# Patient Record
Sex: MALE | Race: WHITE | NOT HISPANIC OR LATINO | Employment: OTHER | URBAN - METROPOLITAN AREA
[De-identification: names, ages, dates, MRNs, and addresses within clinical notes are randomized per-mention and may not be internally consistent; named-entity substitution may affect disease eponyms.]

---

## 2019-06-10 ENCOUNTER — HOSPITAL ENCOUNTER (EMERGENCY)
Facility: CLINIC | Age: 84
Discharge: HOME | End: 2019-06-11
Attending: EMERGENCY MEDICINE | Admitting: EMERGENCY MEDICINE
Payer: COMMERCIAL

## 2019-06-10 DIAGNOSIS — R11.2 NON-INTRACTABLE VOMITING WITH NAUSEA, UNSPECIFIED VOMITING TYPE: ICD-10-CM

## 2019-06-10 LAB
ALBUMIN SERPL-MCNC: 3.5 G/DL (ref 3.4–5)
ALP SERPL-CCNC: 123 U/L (ref 40–150)
ALT SERPL W P-5'-P-CCNC: 30 U/L (ref 0–70)
ANION GAP SERPL CALCULATED.3IONS-SCNC: 2 MMOL/L (ref 3–14)
AST SERPL W P-5'-P-CCNC: 17 U/L (ref 0–45)
BASOPHILS # BLD AUTO: 0 10E9/L (ref 0–0.2)
BASOPHILS NFR BLD AUTO: 0.5 %
BILIRUB SERPL-MCNC: 0.4 MG/DL (ref 0.2–1.3)
BUN SERPL-MCNC: 17 MG/DL (ref 7–30)
CALCIUM SERPL-MCNC: 8.7 MG/DL (ref 8.5–10.1)
CHLORIDE SERPL-SCNC: 100 MMOL/L (ref 94–109)
CO2 SERPL-SCNC: 32 MMOL/L (ref 20–32)
CREAT SERPL-MCNC: 1.01 MG/DL (ref 0.66–1.25)
DIFFERENTIAL METHOD BLD: ABNORMAL
EOSINOPHIL # BLD AUTO: 0.1 10E9/L (ref 0–0.7)
EOSINOPHIL NFR BLD AUTO: 1.1 %
ERYTHROCYTE [DISTWIDTH] IN BLOOD BY AUTOMATED COUNT: 13.2 % (ref 10–15)
GFR SERPL CREATININE-BSD FRML MDRD: 68 ML/MIN/{1.73_M2}
GLUCOSE SERPL-MCNC: 132 MG/DL (ref 70–99)
HCT VFR BLD AUTO: 38.2 % (ref 40–53)
HGB BLD-MCNC: 12.3 G/DL (ref 13.3–17.7)
IMM GRANULOCYTES # BLD: 0 10E9/L (ref 0–0.4)
IMM GRANULOCYTES NFR BLD: 0.5 %
LYMPHOCYTES # BLD AUTO: 1 10E9/L (ref 0.8–5.3)
LYMPHOCYTES NFR BLD AUTO: 12.3 %
MCH RBC QN AUTO: 29.3 PG (ref 26.5–33)
MCHC RBC AUTO-ENTMCNC: 32.2 G/DL (ref 31.5–36.5)
MCV RBC AUTO: 91 FL (ref 78–100)
MONOCYTES # BLD AUTO: 0.7 10E9/L (ref 0–1.3)
MONOCYTES NFR BLD AUTO: 8.9 %
NEUTROPHILS # BLD AUTO: 6.3 10E9/L (ref 1.6–8.3)
NEUTROPHILS NFR BLD AUTO: 76.7 %
NRBC # BLD AUTO: 0 10*3/UL
NRBC BLD AUTO-RTO: 0 /100
PLATELET # BLD AUTO: 292 10E9/L (ref 150–450)
POTASSIUM SERPL-SCNC: 4.2 MMOL/L (ref 3.4–5.3)
PROT SERPL-MCNC: 7.3 G/DL (ref 6.8–8.8)
RBC # BLD AUTO: 4.2 10E12/L (ref 4.4–5.9)
SODIUM SERPL-SCNC: 134 MMOL/L (ref 133–144)
WBC # BLD AUTO: 8.2 10E9/L (ref 4–11)

## 2019-06-10 PROCEDURE — 80053 COMPREHEN METABOLIC PANEL: CPT | Performed by: EMERGENCY MEDICINE

## 2019-06-10 PROCEDURE — 96361 HYDRATE IV INFUSION ADD-ON: CPT

## 2019-06-10 PROCEDURE — 85025 COMPLETE CBC W/AUTO DIFF WBC: CPT | Performed by: EMERGENCY MEDICINE

## 2019-06-10 PROCEDURE — 96374 THER/PROPH/DIAG INJ IV PUSH: CPT

## 2019-06-10 PROCEDURE — 99285 EMERGENCY DEPT VISIT HI MDM: CPT | Mod: 25

## 2019-06-10 PROCEDURE — 96376 TX/PRO/DX INJ SAME DRUG ADON: CPT

## 2019-06-10 PROCEDURE — 25000128 H RX IP 250 OP 636

## 2019-06-10 PROCEDURE — 25000128 H RX IP 250 OP 636: Performed by: EMERGENCY MEDICINE

## 2019-06-10 RX ORDER — ONDANSETRON 2 MG/ML
INJECTION INTRAMUSCULAR; INTRAVENOUS
Status: COMPLETED
Start: 2019-06-10 | End: 2019-06-10

## 2019-06-10 RX ORDER — ONDANSETRON 2 MG/ML
4 INJECTION INTRAMUSCULAR; INTRAVENOUS ONCE
Status: COMPLETED | OUTPATIENT
Start: 2019-06-10 | End: 2019-06-10

## 2019-06-10 RX ADMIN — ONDANSETRON 4 MG: 2 INJECTION INTRAMUSCULAR; INTRAVENOUS at 22:18

## 2019-06-10 RX ADMIN — SODIUM CHLORIDE 1000 ML: 9 INJECTION, SOLUTION INTRAVENOUS at 22:17

## 2019-06-10 RX ADMIN — ONDANSETRON 4 MG: 2 INJECTION INTRAMUSCULAR; INTRAVENOUS at 23:23

## 2019-06-10 ASSESSMENT — ENCOUNTER SYMPTOMS
ABDOMINAL PAIN: 0
NAUSEA: 1
FEVER: 0
VOMITING: 1

## 2019-06-10 NOTE — ED AVS SNAPSHOT
[facility] Emergency Department  [address]  [facility] [address]  Phone:  [phone]  Fax:  [phone]                                    Edgardo Rice   MRN: [id number]    Department:  [facility] Emergency Department   Date of Visit:  6/10/2019           After Visit Summary Signature Page    I have received my discharge instructions, and my questions have been answered. I have discussed any challenges I see with this plan with the nurse or doctor.    ..........................................................................................................................................  Patient/Patient Representative Signature      ..........................................................................................................................................  Patient Representative Print Name and Relationship to Patient    ..................................................               ................................................  Date                                   Time    ..........................................................................................................................................  Reviewed by Signature/Title    ...................................................              ..............................................  Date                                               Time          22EPIC Rev 08/18

## 2019-06-11 VITALS
WEIGHT: 153 LBS | DIASTOLIC BLOOD PRESSURE: 65 MMHG | RESPIRATION RATE: 16 BRPM | OXYGEN SATURATION: 97 % | TEMPERATURE: 97.3 F | HEART RATE: 51 BPM | SYSTOLIC BLOOD PRESSURE: 169 MMHG

## 2019-06-11 RX ORDER — ONDANSETRON 4 MG/1
4 TABLET, ORALLY DISINTEGRATING ORAL EVERY 6 HOURS PRN
Qty: 15 TABLET | Refills: 0 | Status: SHIPPED | OUTPATIENT
Start: 2019-06-11 | End: 2019-06-11

## 2019-06-11 RX ORDER — ONDANSETRON 4 MG/1
4 TABLET, ORALLY DISINTEGRATING ORAL EVERY 6 HOURS PRN
Qty: 15 TABLET | Refills: 0 | Status: SHIPPED | OUTPATIENT
Start: 2019-06-11 | End: 2020-03-20

## 2019-06-11 NOTE — ED PROVIDER NOTES
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 The patient had a carcinoma removed from cheek around a week ago, he was admitted to the hospital for a day due to post operative nausea. After he returned home, the first few days he felt back to normal. However, the patient reports mouth pain for the last couple or days and emesis starting today which is why he reports today. Here, he reports that he is unable to keep any food or liquids down. Last night the patient took one of his prescribed oxycodone, however it did not help. The patient denies any fevers, abdominal pain, or mouth pain when vomiting. Of note, the patient has experienced post operative nausea, but never for this long after surgery.     Allergies:  Atorvastatin  Cholestyramine  Diphenhydramine  Droperidol  Morphine  Norco  Sulfa drugs  Tramadol  Tylenol with Codeine     Medications:    Lopressor  HCTZ  Norvasc  Lisinopril    Past Medical History:    Retinal emboli  Hyperopia bilateral with astigmatism and presbyopia  Macular degeneration  Hyperlipidemia  Diverticulitis  Anemia  Hypoxia  Bilateral pseudophakia    Past Surgical History:    Knee Arthroscopy  Hemorrhoidectomy  Herniorrhaphy   Vasectomy  Carotid endardectomy  Esophagogastroduodenoscopy    Family History:    Cancer-prostate  Hyperlipidemia  HTN    Social History:  The patient was accompanied to the ED by his daughter.  Smoking Status: former smoker  Smokeless Tobacco: negative  Alcohol Use: positive, 2 cans of beer/ week  Drug Use: negative  Marital Status:   [2]       Review of Systems   Constitutional: Negative for fever.   HENT:        Mouth Pain     Gastrointestinal: Positive for nausea and vomiting. Negative for abdominal pain.   All other systems reviewed and are negative.      Physical Exam     Patient Vitals for the past 24 hrs:    BP Temp Temp src Pulse Resp SpO2 Weight   06/11/19 0025 -- -- -- -- -- 96 % --   06/11/19 0000 181/72 -- -- 50 -- 95 % --   06/10/19 2141 180/75 97.3  F (36.3  C) Temporal 58 16 97 % 69.4 kg (153 lb)       Physical Exam  Nursing note and vitals reviewed.  Constitutional: Cooperative.   HENT:   Mouth/Throat: Moist mucous membranes. Skin graft appears intact without signs of purulence.  Eyes: EOMI, nonicteric sclera  Cardiovascular: Normal rate, regular rhythm, no murmurs, rubs, or gallops  Pulmonary/Chest: Effort normal and breath sounds normal. No respiratory distress. No wheezes. No rales.   Abdominal: Soft. Nontender, nondistended, no guarding or rigidity. BS present.   Musculoskeletal: Normal range of motion.   Neurological: Alert. Moves all extremities spontaneously.   Skin: Skin is warm and dry. No rash noted. Left thigh skin graft site appears to be healing appropriately without signs of infection.   Psychiatric: Normal mood and affect.       Emergency Department Course     Laboratory:  CBC: WBC: 8.2, HGB: 12.3 (L), PLT: 292  CMP: Glucose 132 (H), Anion Gap 2 (L), o/w WNL (Creatinine: 1.01)    Interventions:  2213 NS 1L IV   Zofran 4 mg IV  2320 Zofran 4 mg IV    Emergency Department Course:  Nursing notes and vitals reviewed. (2202) I performed an exam of the patient as documented above.      IV inserted. Medicine administered as documented above. Blood drawn. This was sent to the lab for further testing, results above.    (2220) I consulted with Dr. Magana, ENT, regarding the patient's history and presentation here in the emergency department.     (0040)  I rechecked the patient and discussed the results of his workup thus far and plan for discharge.      Findings and plan explained to the Patient. Patient discharged home with instructions regarding supportive care, medications, and reasons to return. The importance of close follow-up was reviewed. The patient was prescribed Zofran.     I personally reviewed  the laboratory results with the Patient and answered all related questions prior to discharge.    Impression & Plan    Medical Decision Making:  Edgardo Rice is a 84 year old male who presents for evaluation of nausea and vomiting in the context of recent surgery. Exam overall normal. Labs unremarkable. Pt has significant history of frequent nausea/vomiting post-operatively. He even spent the night in the hospital after this last surgery. Unfortunately he wasn't discharged with any anti-emetics and presents here with ongoing symptoms. This is all most likely due to the surgery and post-operative state. Doubt gastroenteritis/food poisoning. Discussed with on call ENT, Dr. Magana, who is in agreement with antiemetics and IVF. He stated he'd be able to see pt in clinic tomorrow and Dr. Ruvalcaba would be able to see on Wednesday. He is feeling improved after zofran and IVF. Tolerated applesauce and fluids here. I believe he's safe/stable for discharge home. All questions answered. He and family are in agreement with plan.       Diagnosis:    ICD-10-CM    1. Non-intractable vomiting with nausea, unspecified vomiting type R11.2        Disposition:  discharged to home    Discharge Medications:     Medication List      Started    ondansetron 4 MG ODT tab  Commonly known as:  ZOFRAN ODT  4 mg, Oral, EVERY 6 HOURS PRN          Scribe Disclosure:  I, Sharona Reardon, am serving as a scribe on 6/10/2019 at 10:16 PM to personally document services performed by Joe Alarcon MD based on my observations and the provider's statements to me.       Sharona Reardon  6/10/2019   [facility] EMERGENCY DEPARTMENT       Joe Alarcon MD  06/13/19 4798

## 2020-01-13 ENCOUNTER — HOSPITAL ENCOUNTER (EMERGENCY)
Facility: CLINIC | Age: 85
Discharge: HOME | End: 2020-01-13
Attending: NURSE PRACTITIONER | Admitting: NURSE PRACTITIONER
Payer: COMMERCIAL

## 2020-01-13 VITALS
DIASTOLIC BLOOD PRESSURE: 89 MMHG | SYSTOLIC BLOOD PRESSURE: 215 MMHG | RESPIRATION RATE: 18 BRPM | HEART RATE: 74 BPM | TEMPERATURE: 99.6 F | OXYGEN SATURATION: 95 %

## 2020-01-13 DIAGNOSIS — Z51.89 VISIT FOR WOUND CHECK: ICD-10-CM

## 2020-01-13 PROCEDURE — 99282 EMERGENCY DEPT VISIT SF MDM: CPT

## 2020-01-13 ASSESSMENT — ENCOUNTER SYMPTOMS
COLOR CHANGE: 0
WOUND: 1
WEAKNESS: 0
NUMBNESS: 0
FEVER: 0

## 2020-01-13 NOTE — ED AVS SNAPSHOT
[facility] Emergency Department  [address]  [facility] [address]  Phone:  [phone]  Fax:  [phone]                                    Edgardo Rice   MRN: [id number]    Department:  [facility] Emergency Department   Date of Visit:  1/13/2020           After Visit Summary Signature Page    I have received my discharge instructions, and my questions have been answered. I have discussed any challenges I see with this plan with the nurse or doctor.    ..........................................................................................................................................  Patient/Patient Representative Signature      ..........................................................................................................................................  Patient Representative Print Name and Relationship to Patient    ..................................................               ................................................  Date                                   Time    ..........................................................................................................................................  Reviewed by Signature/Title    ...................................................              ..............................................  Date                                               Time          22EPIC Rev 08/18

## 2020-01-14 NOTE — ED PROVIDER NOTES
History     Chief Complaint:  Wound Check       The history is provided by the patient.      Edgardo Rice is a 85 year old male who presents with an open wound to the dorsal aspect of his left hand. The patient states that he was scratched by his cat a couple of weeks ago and this had scabbed over, and today he put his hand into his pocket and the scab was pulled off. The patient states that the wound has been persistently bleeding since then. To note, the patient is currently on aspirin.     Allergies:  Atorvastatin  Cholestyramine  Diphenhydramine  Droperidol  Morphine  Norco [Hydrocodone-Acetaminophen]  Sulfa Drugs  Tramadol  Tylenol With Codeine [Acetaminophen-Codeine]     Medications:    Zofran  Aspirin    Past Medical History:    The patient denies any significant past medical history.    Past Surgical History:    The patient does not have any pertinent past surgical history.    Family History:    No past pertinent family history.    Social History:  The patient presents to the ED alone.  PCP: Bibiana Umanzor     Review of Systems   Constitutional: Negative for fever.   Skin: Positive for wound (Open wound to left hand). Negative for color change.   Neurological: Negative for weakness and numbness.   All other systems reviewed and are negative.      Physical Exam     Patient Vitals for the past 24 hrs:   BP Temp Temp src Pulse Resp SpO2   01/13/20 1957 (!) 215/89 99.6  F (37.6  C) Temporal 74 18 95 %       Physical Exam  General: Alert, Mild discomfort, well kept   HENT:  Normal voice, No lymphadenopathy  Eyes:  The pupils are equal, round, and reactive to light, Conjunctiva normal, No scleral icterus   Neck:  Normal range of motion  CV:  Normal Pulses, Normal cap refill  Resp:  Non-labored, No cough  MS:  Small wound to dorsal aspect of eft hand, No indication for Flexor or extensor tendon injury, Normal ROM if all digits  Skin:  Approximately 1 cm x 1 cm coronary type radiation versus skin tear  "dorsum of left hand.  No proximal streaking or erythema.  Mild venous oozing.  Nontender.  Neuro:  Speech is normal and fluent, Normal sensation  Psych:  Awake. Alert.  Normal affect.  Appropriate interactions. Good eye contact    Emergency Department Course     Emergency Department Course:  Past medical records, nursing notes, and vitals reviewed.    2007 I performed an exam of the patient as documented above.     Surgifoam placed on the patient's wound.     2040 I rechecked the patient.    Findings and plan explained to the patient. Patient discharged home with instructions regarding supportive care, medications, and reasons to return. The importance of close follow-up was reviewed.    I personally answered all related questions prior to discharge.     Impression & Plan     Medical Decision Making:  Edgardo Rice is a 85 year old male who presents today for evaluation of difficulty controlling bleeding from a hand wound.  He reports that his cat scratched him a couple of weeks ago and has had a scab on that hand until today when he reached in his pocket and the scab was torn off.  Since that time he has had difficulty controlling the bleeding therefore presented for evaluation.  His examination is consistent with small skin tear.  This is nonsuturable.  A Surgifoam dressing is placed.  He is advised on appropriate wound care.  There is no indication of infection at this time.  No indication for antibiotics.  Of note he did have a elevated blood pressure today which he states happens \"all of the time at the doctor's office.\"  He does regularly check his blood pressure at home and states it is in the normal range in the 110s-120 systolically.  He has no signs of hypertensive crisis or emergency.  He is advised on appropriate care of the wound.  He appears to be safe and appropriate for outpatient management follow-up and is discharged home.    Diagnosis:    ICD-10-CM    1. Visit for wound check Z51.89  "       Disposition:  Discharged to home.    Scribe Disclosure:  I, Gelacio Mckay, am serving as a scribe at 8:05 PM on 1/13/2020 to document services personally performed by Eric Torres APRN based on my observations and the provider's statements to me.      Eric Torres APRN CNP  01/13/20 2100

## 2020-01-14 NOTE — ED TRIAGE NOTES
Patient had a scab that fell off the top of his hand when he put his hand in pants pocket and now cut will not stop bleeding. Bleeding controlled. Last tetanus 2016

## 2020-03-20 ENCOUNTER — APPOINTMENT (OUTPATIENT)
Dept: CARDIOLOGY | Facility: CLINIC | Age: 85
End: 2020-03-20
Attending: EMERGENCY MEDICINE
Payer: COMMERCIAL

## 2020-03-20 ENCOUNTER — APPOINTMENT (OUTPATIENT)
Dept: MRI IMAGING | Facility: CLINIC | Age: 85
End: 2020-03-20
Attending: EMERGENCY MEDICINE
Payer: COMMERCIAL

## 2020-03-20 ENCOUNTER — HOSPITAL ENCOUNTER (EMERGENCY)
Facility: CLINIC | Age: 85
Discharge: HOME | End: 2020-03-20
Attending: EMERGENCY MEDICINE | Admitting: EMERGENCY MEDICINE
Payer: COMMERCIAL

## 2020-03-20 VITALS
OXYGEN SATURATION: 94 % | WEIGHT: 161.6 LBS | TEMPERATURE: 97.7 F | DIASTOLIC BLOOD PRESSURE: 76 MMHG | HEART RATE: 54 BPM | RESPIRATION RATE: 16 BRPM | SYSTOLIC BLOOD PRESSURE: 168 MMHG

## 2020-03-20 DIAGNOSIS — I63.9 CEREBROVASCULAR ACCIDENT (CVA), UNSPECIFIED MECHANISM (H): ICD-10-CM

## 2020-03-20 LAB
ANION GAP SERPL CALCULATED.3IONS-SCNC: 3 MMOL/L (ref 3–14)
BASOPHILS # BLD AUTO: 0.1 10E9/L (ref 0–0.2)
BASOPHILS NFR BLD AUTO: 1 %
BUN SERPL-MCNC: 17 MG/DL (ref 7–30)
CALCIUM SERPL-MCNC: 8.6 MG/DL (ref 8.5–10.1)
CHLORIDE SERPL-SCNC: 107 MMOL/L (ref 94–109)
CHOLEST SERPL-MCNC: 221 MG/DL
CO2 SERPL-SCNC: 30 MMOL/L (ref 20–32)
CREAT SERPL-MCNC: 0.86 MG/DL (ref 0.66–1.25)
DIFFERENTIAL METHOD BLD: NORMAL
EOSINOPHIL # BLD AUTO: 0.1 10E9/L (ref 0–0.7)
EOSINOPHIL NFR BLD AUTO: 2 %
ERYTHROCYTE [DISTWIDTH] IN BLOOD BY AUTOMATED COUNT: 12.3 % (ref 10–15)
GFR SERPL CREATININE-BSD FRML MDRD: 80 ML/MIN/{1.73_M2}
GLUCOSE BLDC GLUCOMTR-MCNC: 117 MG/DL (ref 70–99)
GLUCOSE SERPL-MCNC: 113 MG/DL (ref 70–99)
HBA1C MFR BLD: 5.6 % (ref 0–5.6)
HCT VFR BLD AUTO: 45.7 % (ref 40–53)
HDLC SERPL-MCNC: 43 MG/DL
HGB BLD-MCNC: 14.5 G/DL (ref 13.3–17.7)
IMM GRANULOCYTES # BLD: 0 10E9/L (ref 0–0.4)
IMM GRANULOCYTES NFR BLD: 0.3 %
INTERPRETATION ECG - MUSE: NORMAL
LDLC SERPL CALC-MCNC: ABNORMAL MG/DL
LYMPHOCYTES # BLD AUTO: 1 10E9/L (ref 0.8–5.3)
LYMPHOCYTES NFR BLD AUTO: 13.9 %
MCH RBC QN AUTO: 30.7 PG (ref 26.5–33)
MCHC RBC AUTO-ENTMCNC: 31.7 G/DL (ref 31.5–36.5)
MCV RBC AUTO: 97 FL (ref 78–100)
MONOCYTES # BLD AUTO: 0.5 10E9/L (ref 0–1.3)
MONOCYTES NFR BLD AUTO: 7.6 %
NEUTROPHILS # BLD AUTO: 5.4 10E9/L (ref 1.6–8.3)
NEUTROPHILS NFR BLD AUTO: 75.2 %
NONHDLC SERPL-MCNC: 178 MG/DL
NRBC # BLD AUTO: 0 10*3/UL
NRBC BLD AUTO-RTO: 0 /100
PLATELET # BLD AUTO: 241 10E9/L (ref 150–450)
POTASSIUM SERPL-SCNC: 4.1 MMOL/L (ref 3.4–5.3)
RBC # BLD AUTO: 4.73 10E12/L (ref 4.4–5.9)
SODIUM SERPL-SCNC: 140 MMOL/L (ref 133–144)
TRIGL SERPL-MCNC: 416 MG/DL
TROPONIN I SERPL-MCNC: <0.015 UG/L (ref 0–0.04)
TSH SERPL DL<=0.005 MIU/L-ACNC: 1.27 MU/L (ref 0.4–4)
WBC # BLD AUTO: 7.1 10E9/L (ref 4–11)

## 2020-03-20 PROCEDURE — 84443 ASSAY THYROID STIM HORMONE: CPT | Performed by: EMERGENCY MEDICINE

## 2020-03-20 PROCEDURE — 80061 LIPID PANEL: CPT | Performed by: PHYSICIAN ASSISTANT

## 2020-03-20 PROCEDURE — A9585 GADOBUTROL INJECTION: HCPCS | Performed by: EMERGENCY MEDICINE

## 2020-03-20 PROCEDURE — 93306 TTE W/DOPPLER COMPLETE: CPT

## 2020-03-20 PROCEDURE — 99285 EMERGENCY DEPT VISIT HI MDM: CPT | Mod: 25

## 2020-03-20 PROCEDURE — 93270 REMOTE 30 DAY ECG REV/REPORT: CPT

## 2020-03-20 PROCEDURE — 00000146 ZZHCL STATISTIC GLUCOSE BY METER IP

## 2020-03-20 PROCEDURE — 80061 LIPID PANEL: CPT | Performed by: EMERGENCY MEDICINE

## 2020-03-20 PROCEDURE — 93306 TTE W/DOPPLER COMPLETE: CPT | Mod: 26 | Performed by: INTERNAL MEDICINE

## 2020-03-20 PROCEDURE — 93005 ELECTROCARDIOGRAM TRACING: CPT | Mod: 59

## 2020-03-20 PROCEDURE — 93272 ECG/REVIEW INTERPRET ONLY: CPT | Performed by: INTERNAL MEDICINE

## 2020-03-20 PROCEDURE — 70544 MR ANGIOGRAPHY HEAD W/O DYE: CPT | Mod: XS

## 2020-03-20 PROCEDURE — 70553 MRI BRAIN STEM W/O & W/DYE: CPT

## 2020-03-20 PROCEDURE — 25000132 ZZH RX MED GY IP 250 OP 250 PS 637: Performed by: EMERGENCY MEDICINE

## 2020-03-20 PROCEDURE — 84484 ASSAY OF TROPONIN QUANT: CPT | Performed by: EMERGENCY MEDICINE

## 2020-03-20 PROCEDURE — 83036 HEMOGLOBIN GLYCOSYLATED A1C: CPT | Performed by: EMERGENCY MEDICINE

## 2020-03-20 PROCEDURE — 25500064 ZZH RX 255 OP 636: Performed by: EMERGENCY MEDICINE

## 2020-03-20 PROCEDURE — 70549 MR ANGIOGRAPH NECK W/O&W/DYE: CPT

## 2020-03-20 PROCEDURE — 25800025 ZZH RX 258: Performed by: EMERGENCY MEDICINE

## 2020-03-20 PROCEDURE — 85025 COMPLETE CBC W/AUTO DIFF WBC: CPT | Performed by: EMERGENCY MEDICINE

## 2020-03-20 PROCEDURE — 80048 BASIC METABOLIC PNL TOTAL CA: CPT | Performed by: EMERGENCY MEDICINE

## 2020-03-20 RX ORDER — ACETAMINOPHEN 500 MG
1000 TABLET ORAL 3 TIMES DAILY PRN
COMMUNITY

## 2020-03-20 RX ORDER — CLOPIDOGREL BISULFATE 75 MG/1
300 TABLET ORAL ONCE
Status: COMPLETED | OUTPATIENT
Start: 2020-03-20 | End: 2020-03-20

## 2020-03-20 RX ORDER — ACYCLOVIR 200 MG/1
30 CAPSULE ORAL ONCE
Status: COMPLETED | OUTPATIENT
Start: 2020-03-20 | End: 2020-03-20

## 2020-03-20 RX ORDER — ASPIRIN 325 MG
325 TABLET ORAL DAILY
Qty: 30 TABLET | Refills: 3 | Status: ON HOLD | OUTPATIENT
Start: 2020-03-20 | End: 2020-06-10

## 2020-03-20 RX ORDER — ASPIRIN 81 MG/1
81 TABLET, CHEWABLE ORAL ONCE
Status: COMPLETED | OUTPATIENT
Start: 2020-03-20 | End: 2020-03-20

## 2020-03-20 RX ORDER — LISINOPRIL 20 MG/1
40 TABLET ORAL DAILY
Status: ON HOLD | COMMUNITY
End: 2021-01-27

## 2020-03-20 RX ORDER — GADOBUTROL 604.72 MG/ML
10 INJECTION INTRAVENOUS ONCE
Status: COMPLETED | OUTPATIENT
Start: 2020-03-20 | End: 2020-03-20

## 2020-03-20 RX ORDER — METOPROLOL TARTRATE 25 MG/1
25 TABLET, FILM COATED ORAL 2 TIMES DAILY
Status: ON HOLD | COMMUNITY
End: 2020-06-10

## 2020-03-20 RX ORDER — AMLODIPINE BESYLATE 10 MG/1
10 TABLET ORAL AT BEDTIME
COMMUNITY

## 2020-03-20 RX ORDER — GABAPENTIN 300 MG/1
900 CAPSULE ORAL AT BEDTIME
COMMUNITY

## 2020-03-20 RX ORDER — MULTIPLE VITAMINS W/ MINERALS TAB 9MG-400MCG
1 TAB ORAL DAILY
COMMUNITY

## 2020-03-20 RX ORDER — CLOPIDOGREL BISULFATE 75 MG/1
75 TABLET ORAL DAILY
Qty: 30 TABLET | Refills: 3 | Status: ON HOLD | OUTPATIENT
Start: 2020-03-20 | End: 2020-06-10

## 2020-03-20 RX ADMIN — GADOBUTROL 10 ML: 604.72 INJECTION INTRAVENOUS at 12:23

## 2020-03-20 RX ADMIN — CLOPIDOGREL BISULFATE 300 MG: 75 TABLET ORAL at 13:53

## 2020-03-20 RX ADMIN — SODIUM CHLORIDE 30 ML: 9 INJECTION, SOLUTION INTRAMUSCULAR; INTRAVENOUS; SUBCUTANEOUS at 14:45

## 2020-03-20 RX ADMIN — ASPIRIN 81 MG 81 MG: 81 TABLET ORAL at 13:53

## 2020-03-20 ASSESSMENT — ENCOUNTER SYMPTOMS
HEADACHES: 0
LIGHT-HEADEDNESS: 1

## 2020-03-20 NOTE — ED PROVIDER NOTES
History     Chief Complaint:  Visual disturbance      The history is provided by the patient.      Edgardo Rice is a 85 year old male with a history of hypertension controlled with Lisinopril, CAD and carotid endarterectomy and who presents with blurred vision. According to the patient, starting yesterday his vision seemed to be blurrier than normal. He explains he sometimes sees black lines when looking at a blank surface, and while he is watching TV, he has diplopia. He says these abnormalities are the same in both eyes. He reports no changes in these symptoms today, but notes some lightheadedness that is new today. He denies headache. Of note, the patient states he recently had imaging of his brain and things came back looking normal.      Allergies:  Atorvastatin  Cholestyramine  Diphenhydramine  Droperidol  Morphine  Norco   Sulfa Drugs  Tramadol  Tylenol With Codeine      Medications:    Aspirin 81 mg   Lopressor   Norvasc   Prilosec  Lisinopril   Gabapentin     Past Medical History:    Pneumonia    Tongue mass  CAD  Esophageal reflux  Diverticulitis   Hypercholesterolemia   Hypertension  Macular degeneration   Squamous cell carcinoma  Bilateral pseudophakia  Sepsis  Spinal stenosis  Skin melanoma  Hiatus hernia  Osteoarthritis  Peptic ulcer    Past Surgical History:    Hernia repair x3  EGD  Cholecystectomy   Carotid endarterectomy  Cataracts removal x2  Hemorrhoidectomy  Knee arthroscopy   Vasectomy  Mohs head and neck     Family History:    Prostate cancer  Heart disease  Hyperlipidemia  Hypertension  Melanoma    Social History:  Smoking status: former smoker  Alcohol use: yes  PCP: [facility]  Presents to the ED alone  Marital Status:   [2]     Review of Systems   Eyes: Positive for visual disturbance (blurred and doubel vision).   Neurological: Positive for light-headedness. Negative for headaches.   All other systems reviewed and are negative.    Physical Exam     Patient Vitals  for the past 24 hrs:   BP Temp Temp src Pulse Heart Rate Resp SpO2 Weight   03/20/20 1653 -- -- -- -- -- 16 -- --   03/20/20 1630 (!) 168/76 -- -- 54 -- -- 94 % --   03/20/20 1615 (!) 162/63 -- -- 56 -- -- 93 % --   03/20/20 1515 (!) 197/85 -- -- 58 -- -- 98 % --   03/20/20 1445 (!) 180/80 -- -- 57 -- -- 98 % --   03/20/20 1430 (!) 168/76 -- -- 53 -- -- 95 % --   03/20/20 1330 (!) 183/76 -- -- 53 -- -- 97 % --   03/20/20 1215 (!) 172/71 -- -- 51 -- -- 96 % --   03/20/20 1200 (!) 167/63 -- -- 57 -- -- 97 % --   03/20/20 1130 (!) 174/62 -- -- 53 -- -- 96 % --   03/20/20 1115 (!) 204/83 -- -- 63 -- -- 96 % --   03/20/20 1105 (!) 190/89 97.7  F (36.5  C) Oral 63 63 16 97 % 73.3 kg (161 lb 9.6 oz)         Physical Exam   Constitutional: Vital signs reviewed as above.   HENT:               Head: No external signs of trauma noted.              Eyes: Pupils are equal, round, and reactive to light. No papilledema noted on limited ED exam.  Cardiovascular: Normal rate, regular rhythm, normal heart sounds and intact distal pulses.    Pulmonary/Chest: Effort normal and breath sounds normal. No respiratory distress. No wheezes noted.   Gastrointestinal: Soft. There is no tenderness. There is no rebound.   Musculoskeletal:              No deformities appreciated              No edema noted  Neurological:               Patient is alert and oriented to person, place, and time.               Speech is fluent, cognition is normal.              CN 2-12 intact (PERRL, EOMI, symmetric smile, equal eye squeeze and forehead raise, normal and equal sensation to bilateral forehead/cheek/chin, equal hearing to finger rub, midline tongue protrusion with nl side-to-side movement, normal shoulder shrug).               RUE strength 5/5: , finger abd, wrist flex/ext, elbow flex/ext.               LUE strength 5/5: , finger abd, wrist flex/ext, elbow flex/ext.               RLE strength 5/5: ankle flex/ext, knee flex/ext, hip flex.                LLE strength 5/5: ankle flex/ext, knee flex/ext, hip flex.               Sensation equal in all 4 extremities.               No arm drift.                Cerebellar: Normal rapid alternating movements                           ( finger-nose-finger, rapid pronation/supination, hand rolling)                          Normal heel-to-shin  Skin: Skin is warm and dry.   Psychiatric: The patient appears calm    Emergency Department Course     ECG:  NSR  Nonspecific ST abnormality  Abnormal EKG  Rate: 61. MT: 134. QRS: 88. QTc: 398.  P-R-T Axes:   19   30   2  No change noted from 8/11/2009  Interpreted by me at 1145 on 3/20/2020    Imaging:  Radiology findings were communicated with the patient who voiced understanding of the findings.    MR Brain w/o & w/ contrast:  1.  Small, 4 mm area of restricted diffusion in the cortex of the   right parietal lobe. Suspect small recent infarct.     2. There is diffuse parenchymal volume loss.  White matter changes are   present in the cerebral hemispheres that are consistent with small   vessel ischemic disease in this age patient, as per radiology.      MR Head w/o contrast Angiogram:  Areas of mild stenosis in the middle cerebral artery   distributions and right posterior cerebral. This is compatible with   atherosclerotic disease, as per radiology.      MR Neck w/o & w/ contrast Angiogram:  1. No stenosis is seen at the right carotid bifurcation.   2. Left carotid endarterectomy. No stenosis identified at the left   carotid bifurcation.   3. Vertebral arteries are unremarkable, as per radiology.      Echocardiogram Complete with Bubble study:  1. Normal biventricular size and function. Left ventricular ejection fraction   of 55-60%. No segmental wall motion abnormalities noted.   2. There is moderate left atrial enlargement.   3. There is mild-moderate (1-2+) mitral regurgitation.   4. Normal pulmonary artery pressure.   No prior study for comparison.   Technically adequate  study, as per Dr. Irwin.     Cardiac Event Monitor Adult Pediatric:  Pending    Laboratory:  Laboratory findings were communicated with the patient who voiced understanding of the findings.    CBC: WBC: 7., HGB: 14.5, PLT: 241    BMP: Glucose 113 (H) o/w WNL (Creatinine 0.86)    1103 Glucose by meter: 117 (H)    1112 Troponin: <0.015        Lipid profile: Cholesterol 22 (H), Triglycerides 416 (H), Non HDL Cholesterol 178 (H)    Hemoglobin A1c: 5.6    TSH with free T4 reflex: 1.27    Procedures:  None     Interventions:  1223 Gadavist 10 mL IV  1353 Plavix 00 mg PO  1353 Aspirin 81 mg PO  1445 Sodium Chloride Bacteriostatic 30 mL IV    Emergency Department Course:  Past medical records, nursing notes, and vitals reviewed.    1103 I performed an exam of the patient as documented above.     1105 Blood sugar 117.     EKG obtained in the ED, see results above.      IV was inserted and blood was drawn for laboratory testing, results above.    The patient was sent for MRIs of the head, brain and neck while in the emergency department, results above.     1132 I consulted with Dr. Vickers, Stroke Neurology, regarding the patient's history and presentation here in the emergency department.      1337 D/W Dr. Vickers. Discussed MRI result. Patient reportedly took full dose aspirin this AM. Order Plavix 300 mg load. Admit. Recommend 325 mg daily ASA with daily 75    1340 Patient rechecked and updated.      1346 Patient rechecked and updated.      1401 I consulted with Myesha Campos PA-C.     1539 I consulted with Dr. Vickers regarding the patient's history and presentation here in the emergency department.  If we can get both the echo and the event monitor placed, the patient can be discharged.  Given his extensive atherosclerotic disease the recommendation would be for 75 mg of Plavix and 325 mg of aspirin daily for 3 months and after that time reevaluation of anticoagulation status is reasonable.    1619: Rechecked and  updated.    I discussed the treatment plan with the patient. They expressed understanding of this plan and consented to discharge. They will be discharged home with instructions for care and follow up. In addition, the patient will return to the emergency department if their symptoms persist, worsen, if new symptoms arise or if there is any concern.  All questions were answered. The patient was prescribed Plavix and Aspirin.     I personally reviewed the laboratory and imaging results with the Patient and answered all related questions prior to discharge.     Impression & Plan     CMS Diagnoses: The patient has stroke symptoms:           ED Stroke specific documentation           NIHSS PDF          Protocol PDF     Patient last known well time: 3/19/2020  ED Provider first to bedside at: 1103  MRI Results received at: 1328    tPA:   Not given due to minor / isolated / quickly resolving stroke symptoms.    If treating with tPA: Ensure SBP<185 and DBP<105 prior to treatment with IV tPA.  Administering IV tPA after treatment with IV labetalol, hydralazine, or nicardipine is reasonable once BP control is established.    Endovascular Retrieval:  Not initiated due to absence of proximal vessel occlusion    National Institutes of Health Stroke Scale (Baseline)  Time Performed: 1103      Score    Level of consciousness: (0)   Alert, keenly responsive    LOC questions: (0)   Answers both questions correctly    LOC commands: (0)   Performs both tasks correctly    Best gaze: (0)   Normal    Visual: (0)   No visual loss    Facial palsy: (0)   Normal symmetrical movements    Motor arm (left): (0)   No drift    Motor arm (right): (0)   No drift    Motor leg (left): (0)   No drift    Motor leg (right): (0)   No drift    Limb ataxia: (0)   Absent    Sensory: (0)   Normal- no sensory loss    Best language: (0)   Normal- no aphasia    Dysarthria: (0)   Normal    Extinction and inattention: (0)   No abnormality        Total Score:  0         Stroke Mimics were considered (including migraine headache, seizure disorder, hypoglycemia (or hyperglycemia), head or spinal trauma, CNS infection, Toxin ingestion and shock state (e.g. sepsis) .    Medical Decision Making:  Edgardo Rice is a 85 year old male who presents to the emergency department today with visual changes.  Please see the HPI and exam for specifics.  The patient's neurological exam appeared normal in the ED except for a small amount of blurry vision when testing visual fields on the left eye.  There did not seem to be any obvious visual field cut today.  The patient was actually found to have had a recent stroke.  Generally, the patient does not appear to have any acute symptoms.  After discussion with neurology and the hospitalist team, and given the fact that we were able to perform an echocardiogram and attached an event monitor, we believe the patient can be discharged to follow with neurology in the outpatient setting.  Because of his extensive atherosclerotic disease he will be discharged with 3 months worth of daily Plavix and aspirin.  He should follow-up as noted and return to the ED with any worsening or concerning symptoms.  Anticipatory guidance given prior to discharge.    Discharge Diagnosis:    ICD-10-CM    1. Cerebrovascular accident (CVA), unspecified mechanism (H)  I63.9 Lipid Profile     Lipid Profile     Hemoglobin A1c     Hemoglobin A1c     TSH with free T4 reflex     TSH with free T4 reflex     CANCELED: TSH with free T4 reflex     CANCELED: Hemoglobin A1c     CANCELED: Lipid Profile       Disposition:  Discharged to home.      Discharge Medications:   New Prescriptions    ASPIRIN (ASA) 325 MG TABLET    Take 1 tablet (325 mg) by mouth daily    CLOPIDOGREL (PLAVIX) 75 MG TABLET    Take 1 tablet (75 mg) by mouth daily     Scribe Disclosure:  Funmilayo SUMMERS, am serving as a scribe at 11:11 AM on 3/20/2020 to document services personally performed by Leonela  Jose Roberto Suresh DO based on my observations and the provider's statements to me.   3/20/2020   [facility] EMERGENCY DEPARTMENT       Gipson, Jose Roberto Suresh DO  03/20/20 1363

## 2020-03-20 NOTE — PHARMACY-ADMISSION MEDICATION HISTORY
Admission medication history interview status for this patient is complete. See [facility] admission navigator for allergy information, prior to admission medications and immunization status.     Medication history interview source(s):Patient  Medication history resources (including written lists, pill bottles, clinic record): Isabella and Surescripts via [facility]  Primary pharmacy: [facility] on [address] ([place])    Changes made to PTA medication list:  Added: all (, MVI w/minerals, acetaminophen, metoprolol tartrate, amlodipine, omeprazole, lisinopril, gabapentin)  Deleted: ondansetron  Changed: NA    Actions taken by pharmacist (provider contacted, etc):None     Additional medication history information:None    Medication reconciliation/reorder completed by provider prior to medication history?  N   (Y/N)     For patients on insulin therapy: NA  (Y/N)    Prior to Admission medications    Medication Sig Last Dose Taking? Auth Provider   acetaminophen (TYLENOL) 500 MG tablet Take 1,000 mg by mouth At Bedtime 3/19/2020 at hs Yes Unknown, Entered By History   amLODIPine (NORVASC) 10 MG tablet Take 10 mg by mouth At Bedtime 3/19/2020 at hs Yes Unknown, Entered By History   aspirin (ASA) 325 MG EC tablet Take 325 mg by mouth daily 3/20/2020 at am Yes Unknown, Entered By History   gabapentin (NEURONTIN) 300 MG capsule Take 600 mg by mouth At Bedtime 3/19/2020 at hs Yes Unknown, Entered By History   lisinopril (ZESTRIL) 20 MG tablet Take 40 mg by mouth daily 3/20/2020 at am Yes Unknown, Entered By History   metoprolol tartrate (LOPRESSOR) 25 MG tablet Take 25 mg by mouth 2 times daily 3/20/2020 at am Yes Unknown, Entered By History   multivitamin w/minerals (THERA-VIT-M) tablet Take 1 tablet by mouth daily 3/20/2020 at am Yes Unknown, Entered By History   omeprazole (PRILOSEC) 20 MG DR capsule Take 20 mg by mouth daily 3/20/2020 at am Yes Unknown, Entered By History

## 2020-03-20 NOTE — ED TRIAGE NOTES
Pt with blurred vision out of both eyes since yesterday. Denies difficulty walking or weakness. ABC's intact, alert and oriented X3.

## 2020-03-20 NOTE — ED NOTES
[facility]  ED Nurse Handoff Report    Edgardo Rice is a 85 year old male   ED Chief complaint: No chief complaint on file.  . ED Diagnosis:   Final diagnoses:   Cerebrovascular accident (CVA), unspecified mechanism (H)     Allergies:   Allergies   Allergen Reactions     Atorvastatin      Cholestyramine      Diphenhydramine      Droperidol      Morphine      Norco [Hydrocodone-Acetaminophen]      Sulfa Drugs      Tramadol      Tylenol With Codeine [Acetaminophen-Codeine]        Code Status: Full Code  Activity level - Baseline/Home:  Independent. Activity Level - Current:   Stand by Assist. Lift room needed: No. Bariatric: No   Needed: No   Isolation: No. Infection: Not Applicable.     Vital Signs:   Vitals:    03/20/20 1130 03/20/20 1200 03/20/20 1215 03/20/20 1330   BP: (!) 174/62 (!) 167/63 (!) 172/71 (!) 183/76   Pulse: 53 57 51 53   Resp:       Temp:       TempSrc:       SpO2: 96% 97% 96% 97%   Weight:           Cardiac Rhythm:  ,      Pain level: 0-10 Pain Scale: 0  Patient confused: No. Patient Falls Risk: Yes.   Elimination Status: Has not needed to void while in ED.   Patient Report - Initial Complaint: blurred vision. Focused Assessment: pt c/o blurred vision since last night, denies weakness.   Tests Performed: MRI, labs. Abnormal Results: small area of infarct.   Treatments provided: Plavix, ASA.  Family Comments: N/A  OBS brochure/video discussed/provided to patient:  N/A  ED Medications:   Medications   sodium chloride (PF) 0.9% PF flush 60 mL (100 mLs Intravenous Given 3/20/20 1223)   gadobutrol (GADAVIST) injection 10 mL (10 mLs Intravenous Given 3/20/20 1223)   clopidogrel (PLAVIX) tablet 300 mg (300 mg Oral Given 3/20/20 1353)   aspirin (ASA) chewable tablet 81 mg (81 mg Oral Given 3/20/20 1353)     Drips infusing:  No  For the majority of the shift, the patient's behavior Green. Interventions performed were N/A.    Sepsis treatment initiated: No       ED Nurse Name/Phone  Number: Julianna Mims RN,   1:56 PM

## 2020-03-20 NOTE — DISCHARGE INSTRUCTIONS
Diagnosis: Stroke  What do you do next: Please follow-up with the neurology contact number I have listed.  You should begin taking Plavix as well as 325 mg of aspirin daily.  This will be for the next 3 months and then these medications can be reevaluated.  When do you return: If you have any sense of dizziness, focal weakness, or any other symptoms that concern you please return to the ED for reevaluation.

## 2020-03-20 NOTE — ED AVS SNAPSHOT
[facility] Emergency Department  [address]  [facility] [address]  Phone:  [phone]  Fax:  [phone]                                    Edgardo Rice   MRN: [id number]    Department:  [facility] Emergency Department   Date of Visit:  3/20/2020           After Visit Summary Signature Page    I have received my discharge instructions, and my questions have been answered. I have discussed any challenges I see with this plan with the nurse or doctor.    ..........................................................................................................................................  Patient/Patient Representative Signature      ..........................................................................................................................................  Patient Representative Print Name and Relationship to Patient    ..................................................               ................................................  Date                                   Time    ..........................................................................................................................................  Reviewed by Signature/Title    ...................................................              ..............................................  Date                                               Time          22EPIC Rev 08/18

## 2020-05-14 NOTE — ED NOTES
Called patient and reviewed Holter results. Encouraged further PCP follow up.     Jose Roberto Gipson,   05/14/20 [id number]

## 2020-06-08 ENCOUNTER — APPOINTMENT (OUTPATIENT)
Dept: MRI IMAGING | Facility: CLINIC | Age: 85
End: 2020-06-08
Attending: PHYSICIAN ASSISTANT
Payer: COMMERCIAL

## 2020-06-08 ENCOUNTER — APPOINTMENT (OUTPATIENT)
Dept: CT IMAGING | Facility: CLINIC | Age: 85
End: 2020-06-08
Attending: PHYSICIAN ASSISTANT
Payer: COMMERCIAL

## 2020-06-08 ENCOUNTER — HOSPITAL ENCOUNTER (OUTPATIENT)
Facility: CLINIC | Age: 85
Setting detail: OBSERVATION
Discharge: HOME | End: 2020-06-10
Attending: PHYSICIAN ASSISTANT | Admitting: INTERNAL MEDICINE
Payer: COMMERCIAL

## 2020-06-08 ENCOUNTER — APPOINTMENT (OUTPATIENT)
Dept: GENERAL RADIOLOGY | Facility: CLINIC | Age: 85
End: 2020-06-08
Attending: PHYSICIAN ASSISTANT
Payer: COMMERCIAL

## 2020-06-08 DIAGNOSIS — I63.89 CEREBROVASCULAR ACCIDENT (CVA) DUE TO OTHER MECHANISM (H): Primary | ICD-10-CM

## 2020-06-08 DIAGNOSIS — I63.9 CEREBROVASCULAR ACCIDENT (H): ICD-10-CM

## 2020-06-08 DIAGNOSIS — R42 DIZZINESS: ICD-10-CM

## 2020-06-08 DIAGNOSIS — R11.2 NAUSEA AND VOMITING: ICD-10-CM

## 2020-06-08 LAB
ALBUMIN SERPL-MCNC: 3.7 G/DL (ref 3.4–5)
ALP SERPL-CCNC: 78 U/L (ref 40–150)
ALT SERPL W P-5'-P-CCNC: 23 U/L (ref 0–70)
ANION GAP SERPL CALCULATED.3IONS-SCNC: 3 MMOL/L (ref 3–14)
AST SERPL W P-5'-P-CCNC: 21 U/L (ref 0–45)
BASOPHILS # BLD AUTO: 0.1 10E9/L (ref 0–0.2)
BASOPHILS NFR BLD AUTO: 0.9 %
BILIRUB SERPL-MCNC: 0.5 MG/DL (ref 0.2–1.3)
BUN SERPL-MCNC: 19 MG/DL (ref 7–30)
CALCIUM SERPL-MCNC: 8.6 MG/DL (ref 8.5–10.1)
CHLORIDE SERPL-SCNC: 103 MMOL/L (ref 94–109)
CO2 SERPL-SCNC: 28 MMOL/L (ref 20–32)
CREAT SERPL-MCNC: 0.92 MG/DL (ref 0.66–1.25)
DIFFERENTIAL METHOD BLD: ABNORMAL
EOSINOPHIL # BLD AUTO: 0 10E9/L (ref 0–0.7)
EOSINOPHIL NFR BLD AUTO: 0.3 %
ERYTHROCYTE [DISTWIDTH] IN BLOOD BY AUTOMATED COUNT: 12.5 % (ref 10–15)
GFR SERPL CREATININE-BSD FRML MDRD: 76 ML/MIN/{1.73_M2}
GLUCOSE SERPL-MCNC: 190 MG/DL (ref 70–99)
HCT VFR BLD AUTO: 41.3 % (ref 40–53)
HGB BLD-MCNC: 13.5 G/DL (ref 13.3–17.7)
IMM GRANULOCYTES # BLD: 0 10E9/L (ref 0–0.4)
IMM GRANULOCYTES NFR BLD: 0.6 %
LYMPHOCYTES # BLD AUTO: 0.7 10E9/L (ref 0.8–5.3)
LYMPHOCYTES NFR BLD AUTO: 10.7 %
MCH RBC QN AUTO: 30.3 PG (ref 26.5–33)
MCHC RBC AUTO-ENTMCNC: 32.7 G/DL (ref 31.5–36.5)
MCV RBC AUTO: 93 FL (ref 78–100)
MONOCYTES # BLD AUTO: 0.4 10E9/L (ref 0–1.3)
MONOCYTES NFR BLD AUTO: 5.5 %
NEUTROPHILS # BLD AUTO: 5.4 10E9/L (ref 1.6–8.3)
NEUTROPHILS NFR BLD AUTO: 82 %
NRBC # BLD AUTO: 0 10*3/UL
NRBC BLD AUTO-RTO: 0 /100
PLATELET # BLD AUTO: 210 10E9/L (ref 150–450)
POTASSIUM SERPL-SCNC: 4 MMOL/L (ref 3.4–5.3)
PROT SERPL-MCNC: 7.2 G/DL (ref 6.8–8.8)
RBC # BLD AUTO: 4.46 10E12/L (ref 4.4–5.9)
SODIUM SERPL-SCNC: 134 MMOL/L (ref 133–144)
TROPONIN I SERPL-MCNC: <0.015 UG/L (ref 0–0.04)
WBC # BLD AUTO: 6.6 10E9/L (ref 4–11)

## 2020-06-08 PROCEDURE — 25000132 ZZH RX MED GY IP 250 OP 250 PS 637: Performed by: PHYSICIAN ASSISTANT

## 2020-06-08 PROCEDURE — 87635 SARS-COV-2 COVID-19 AMP PRB: CPT | Performed by: PHYSICIAN ASSISTANT

## 2020-06-08 PROCEDURE — 83721 ASSAY OF BLOOD LIPOPROTEIN: CPT | Performed by: PHYSICIAN ASSISTANT

## 2020-06-08 PROCEDURE — 70498 CT ANGIOGRAPHY NECK: CPT

## 2020-06-08 PROCEDURE — 25000128 H RX IP 250 OP 636: Performed by: PHYSICIAN ASSISTANT

## 2020-06-08 PROCEDURE — 83735 ASSAY OF MAGNESIUM: CPT | Performed by: PHYSICIAN ASSISTANT

## 2020-06-08 PROCEDURE — 93005 ELECTROCARDIOGRAM TRACING: CPT

## 2020-06-08 PROCEDURE — 80053 COMPREHEN METABOLIC PANEL: CPT | Performed by: PHYSICIAN ASSISTANT

## 2020-06-08 PROCEDURE — 96361 HYDRATE IV INFUSION ADD-ON: CPT

## 2020-06-08 PROCEDURE — 99220 ZZC INITIAL OBSERVATION CARE,LEVL III: CPT | Performed by: INTERNAL MEDICINE

## 2020-06-08 PROCEDURE — 85025 COMPLETE CBC W/AUTO DIFF WBC: CPT | Performed by: PHYSICIAN ASSISTANT

## 2020-06-08 PROCEDURE — 70553 MRI BRAIN STEM W/O & W/DYE: CPT

## 2020-06-08 PROCEDURE — C9803 HOPD COVID-19 SPEC COLLECT: HCPCS

## 2020-06-08 PROCEDURE — 96376 TX/PRO/DX INJ SAME DRUG ADON: CPT

## 2020-06-08 PROCEDURE — 25000125 ZZHC RX 250: Performed by: PHYSICIAN ASSISTANT

## 2020-06-08 PROCEDURE — 99285 EMERGENCY DEPT VISIT HI MDM: CPT | Mod: 25

## 2020-06-08 PROCEDURE — 84100 ASSAY OF PHOSPHORUS: CPT | Performed by: PHYSICIAN ASSISTANT

## 2020-06-08 PROCEDURE — 25800030 ZZH RX IP 258 OP 636: Performed by: PHYSICIAN ASSISTANT

## 2020-06-08 PROCEDURE — 99207 ZZC CDG-CODE CATEGORY CHANGED: CPT | Performed by: INTERNAL MEDICINE

## 2020-06-08 PROCEDURE — 84484 ASSAY OF TROPONIN QUANT: CPT | Performed by: PHYSICIAN ASSISTANT

## 2020-06-08 PROCEDURE — 25500064 ZZH RX 255 OP 636: Performed by: PHYSICIAN ASSISTANT

## 2020-06-08 PROCEDURE — 96374 THER/PROPH/DIAG INJ IV PUSH: CPT | Mod: XS

## 2020-06-08 PROCEDURE — 71045 X-RAY EXAM CHEST 1 VIEW: CPT

## 2020-06-08 PROCEDURE — A9585 GADOBUTROL INJECTION: HCPCS | Performed by: PHYSICIAN ASSISTANT

## 2020-06-08 RX ORDER — IOPAMIDOL 755 MG/ML
500 INJECTION, SOLUTION INTRAVASCULAR ONCE
Status: COMPLETED | OUTPATIENT
Start: 2020-06-08 | End: 2020-06-08

## 2020-06-08 RX ORDER — ONDANSETRON 2 MG/ML
4 INJECTION INTRAMUSCULAR; INTRAVENOUS EVERY 30 MIN PRN
Status: DISCONTINUED | OUTPATIENT
Start: 2020-06-08 | End: 2020-06-09

## 2020-06-08 RX ORDER — MECLIZINE HYDROCHLORIDE 25 MG/1
25 TABLET ORAL ONCE
Status: COMPLETED | OUTPATIENT
Start: 2020-06-08 | End: 2020-06-08

## 2020-06-08 RX ORDER — GADOBUTROL 604.72 MG/ML
7.5 INJECTION INTRAVENOUS ONCE
Status: COMPLETED | OUTPATIENT
Start: 2020-06-08 | End: 2020-06-08

## 2020-06-08 RX ADMIN — MECLIZINE HYDROCHLORIDE 25 MG: 25 TABLET ORAL at 19:20

## 2020-06-08 RX ADMIN — IOPAMIDOL 70 ML: 755 INJECTION, SOLUTION INTRAVENOUS at 22:15

## 2020-06-08 RX ADMIN — ONDANSETRON 4 MG: 2 INJECTION INTRAMUSCULAR; INTRAVENOUS at 20:59

## 2020-06-08 RX ADMIN — ONDANSETRON 4 MG: 2 INJECTION INTRAMUSCULAR; INTRAVENOUS at 18:56

## 2020-06-08 RX ADMIN — GADOBUTROL 7.5 ML: 604.72 INJECTION INTRAVENOUS at 20:05

## 2020-06-08 RX ADMIN — SODIUM CHLORIDE 80 ML: 9 INJECTION, SOLUTION INTRAVENOUS at 22:15

## 2020-06-08 RX ADMIN — SODIUM CHLORIDE 1000 ML: 9 INJECTION, SOLUTION INTRAVENOUS at 18:53

## 2020-06-08 ASSESSMENT — MIFFLIN-ST. JEOR: SCORE: 1335.83

## 2020-06-08 ASSESSMENT — ENCOUNTER SYMPTOMS
VOMITING: 1
NUMBNESS: 0
DIZZINESS: 1
NAUSEA: 1
WEAKNESS: 0
HEADACHES: 1
SPEECH DIFFICULTY: 0

## 2020-06-08 NOTE — ED PROVIDER NOTES
History     Chief Complaint:  Dizziness     HPI   Edgardo Rice is a 85 year old male with a history of BPPV, hypertension, hyperlipidemia, CAD, and a stroke currently on Aspirin and Plavix who presents via EMS for evaluation of dizziness. This morning around 0730 as the patient was getting up he started to develop dizziness, described as a spinning sensation that is worse with movement of his head. Since then he has additionally developed nausea, vomiting, and a headache. He has had difficulty balancing throughout the day and has needed to grab onto objects to steady himself while walking. Due to his persistent dizziness throughout the day, the patient called EMS to bring him into the ED for evaluation. He reports a history of vertigo many years ago and feels that his symptoms today are identical to what he experienced with this. He had a stroke earlier this year and notes that he only had double vision with this, and his symptoms today do not feel similar to this.  He has been on Plavix and aspirin after the stroke without missing any doses.  He is not been able to follow-up with neurology outpatient due to the pandemic.  He has not had any recent medication changes. Otherwise, he denies any recent numbness, weakness, speech difficulties, or visual disturbance.     Allergies:  Atorvastatin  Cholestyramine  Diphenhydramine  Droperidol  Morphine   Norco  Sulfa drugs   Tramadol   Tylenol with codeine      Medications:    Tylenol   Amlodipine   Aspirin 325 mg   Plavix   Gabapentin  Lisinopril  Metoprolol tartrate  Omeprazole     Past Medical History:    Benign paroxysmal positional vertigo   Insomnia  Reflux esophagitis   Macular degeneration  Hyperlipidemia  Hypertension    BPH   Diverticulitis    CAD      Past Surgical History:    Knee arthroscopy, right   Hemorrhoidectomy  Hernia repair  CO removal of sperm ducts   Carotid endarterectomy  Cholecystectomy  Tongue biopsy  Mohs procedure  EGD  Cataract extraction  "with intraocular lens implant, left   Cataract extraction with intraocular lens implant, right  Ventral hernia repair     Family History:    Prostate cancer - Father and brother   Heart disease - Mother   Hypertension - Mother   Hyperlipidemia - Mother     Social History:  Tobacco use:    Former cigar smoker, quit 2014   Alcohol use:    Positive   Marital status:       Accompanied to ED by:  EMS      Review of Systems   Eyes: Negative for visual disturbance.   Gastrointestinal: Positive for nausea and vomiting.   Neurological: Positive for dizziness and headaches. Negative for speech difficulty, weakness and numbness.   All other systems reviewed and are negative.    Physical Exam     Patient Vitals for the past 24 hrs:   BP Temp Temp src Pulse Heart Rate Resp SpO2 Height Weight   06/08/20 2245 (!) 177/67 -- -- 54 60 22 100 % -- --   06/08/20 2241 -- 98.6  F (37  C) Temporal -- -- -- -- -- --   06/08/20 2145 (!) 171/80 -- -- 54 55 13 99 % -- --   06/08/20 2130 (!) 177/72 -- -- 51 50 18 99 % -- --   06/08/20 2115 (!) 188/75 -- -- 52 52 13 99 % -- --   06/08/20 2100 (!) 182/74 -- -- (!) 49 50 15 99 % -- --   06/08/20 1945 (!) 176/67 -- -- 51 51 18 100 % -- --   06/08/20 1930 (!) 189/70 -- -- 52 61 8 100 % -- --   06/08/20 1915 (!) 192/71 -- -- 54 57 15 100 % -- --   06/08/20 1900 (!) 197/74 -- -- 52 51 15 99 % -- --   06/08/20 1845 (!) 190/79 -- -- 51 58 17 -- -- --   06/08/20 1830 (!) 178/78 -- -- 51 (!) 49 20 94 % -- --   06/08/20 1826 (!) 196/72 -- -- (!) 49 -- 20 93 % 1.676 m (5' 6\") 70.3 kg (155 lb)     Physical Exam  General: Resting on the bed.  Appears uncomfortable with nausea  Skin: Good turgor, no rash, no unusual bruising or prominent lesions.  HEENT: Head: Normocephalic, atraumatic, no visible or palpable masses, depressions, or scarring.  Eyes: Conjunctiva clear, sclera non-icteric, EOM intact, PERRL.   Throat/pharynx: Mucous membranes moist, no mucosal lesions. Mucosa non-inflamed, no tonsillar " hypertrophy or exudate.   Neck: Supple, without lesions or adenopathy.  Cardiac: Normal rate and regular rhythm, no murmur or gallop.   Lungs: Clear to auscultation and percussion   Abdomen: Bowel sounds normal, no tenderness, organomegaly, masses, or hernia. No guarding or rebound tenderness.   Musculoskeletal: Normal gait and station. Full strength in upper and lower extremities.   Neurologic: Alert and oriented x3. GCS 15. CN II-VII intact. Normal finger to nose, rapid hand movements, heel to shin. No pronator drift.   Psychiatric: Intact recent and remote memory, judgment and insight, normal mood and affect.     [employer] Stroke Scale  Exam Interval: Baseline   Score    Level of consciousness: (0)   Alert, keenly responsive    LOC questions: (0)   Answers both questions correctly    LOC commands: (0)   Performs both tasks correctly    Best gaze: (0)   Normal    Visual: (0)   No visual loss    Facial palsy: (0)   Normal symmetrical movements    Motor arm (left): (0)   No drift    Motor arm (right): (0)   No drift    Motor leg (left): (0)   No drift    Motor leg (right): (0)   No drift    Limb ataxia: (0)   Absent    Sensory: (0)   Normal- no sensory loss    Best language: (0)   Normal- no aphasia    Dysarthria: (0)   Normal    Extinction and inattention: (0)   No abnormality        Total Score:  0     Emergency Department Course   ECG (18:26:14):  Indication: Screening for cardiovascular disease.   Rate 50 bpm. AR interval 170 ms. QRS duration 100 ms. QT/QTc 458/417 ms. P-R-T axes 39 15 9.   Interpretation: Sinus bradycardia, Left ventricular hypertrophy with repolarization abnormality, Abnormal ECG   Agree with computer interpretation. Yes   No significant change compared to EKG dated 3/20/2020.   Interpreted at 1832 by Dr. Almanza.      Imaging:  Radiographic findings were communicated with the patient who voiced understanding of the findings.    MR Brain w/o & w Contrast:  IMPRESSION:   1.   Small areas of restricted diffusion are seen in the right occipital and right parietal lobes. These are consistent with recent infarcts.   2.  There is diffuse parenchymal volume loss. White matter changes are present in the cerebral hemispheres that are consistent with small vessel ischemic disease in this age patient.   Per radiology.     CTA Head Neck w Contrast:   IMPRESSION:   HEAD CTA:   1.  Short segment moderate narrowing distal right P1/proximal P2, similar to prior.   2.  Mild atherosclerotic changes throughout anterior circulation, similar to prior.     NECK CTA:   1.  Post left endarterectomy.   2.  Mild narrowings, as described; without evidence of hemodynamically significant narrowing.   Per radiology.       XR Chest Port:  IMPRESSION: Low lung volumes. Heart size and pulmonary vascularity normal. Slight elevation right hemidiaphragm. Lungs otherwise are clear. Surgical clips left neck.   Per radiology.     Laboratory:  CBC: WNL (WBC 6.6, HGB 13.5, )   CMP: Glucose 190 high, o/w WNL (Creatinine 0.92)   Troponin I 1828: <0.015   Asymptomatic COVID-19 Virus by PCR: Pending     Interventions:  1853 NS 1,000 mL IV  1856 Zofran 4 mg IV  1920 Meclizine 25 mg PO    2059 Zofran 4 mg IV    Emergency Department Course:  Patient was brought to the ED via EMS.     Nursing notes and vitals reviewed.  1835: I performed an exam of the patient as documented above.     I saw the patient in conjunction with Dr. Carolina Almanza.     2109: I updated and reassessed the patient.      2114: I spoke with Dr. Cantu of the stroke neurology service regarding patient's presentation, findings, and plan of care.     2225: I spoke with Dr. Zelaya of the hospitalist service regarding patient's presentation, findings, and plan of care.     2310: I updated and reassessed the patient.     Findings and plan explained to the Patient who consents to admission. Discussed the patient with Dr. Zelaya, who will admit the patient to a  med/surg bed for further monitoring, evaluation, and treatment.     Impression & Plan      Medical Decision Making:  Edgardo Rice is a 85 year old male who presents the ED today for evaluation of dizziness, nausea and vomiting.  Details of the patient's history can be known HPI.  On my exam, patient was neurologically intact, but did have significant discomfort given his persistent dizziness.  Differentials considered included CVA, BPPV, labyrinthitis, otitis, arrhythmia, ACS, dehydration, amongst others.  Basic labs obtained, showing no significant abnormalities.  MRI obtained, and did return showing likely ischemic CVA with small areas of restricted diffusion in the right occipital and right parietal lobes consistent with recent infarcts.  With these results, I did touch base with stroke neurology, who are okay with permissive hypertension, admission, neurology consultation tomorrow.  CTA was added here as well, with findings as above.  Interventions here include IV fluids, meclizine, Zofran.  Patient had significant improvement in symptoms after these interventions.  He has been resting here quietly.  I then discussed his case with Dr. Zelaya who agreed to admit the patient in the hospital for further management and care.  All questions were answered prior to admission.  Patient was in agreement with the treatment plan as stated above.    This was created at least in part with a voice recognition software. Mistakes/typos may be present.      Diagnosis:    ICD-10-CM    1. Cerebrovascular accident (H)  I63.9 Asymptomatic COVID-19 Virus (Coronavirus) by PCR   2. Dizziness  R42    3. Nausea and vomiting  R11.2        Disposition:  Admitted to Dr. Zelaya.      I, Dylan Salgado, am serving as a scribe at 6:35 PM on 6/8/2020 to document services personally performed by So Archibald PA-C, based on my observations and the provider's statements to me.     [facility] EMERGENCY DEPARTMENT        So Archibald PA  06/08/20 9190    Addendum  Patient did have a moment of hypoxia while nursing staff was in the room, oxygen in the 80s on room air.  He was placed on 2 L of supplemental oxygen and remained within normal limits throughout the remainder of his stay.     So Archibald PA  06/08/20 8529

## 2020-06-08 NOTE — ED TRIAGE NOTES
Patient presents with complaints of dizziness and nausea today.  Had a stroke 3 months ago with no deficets.  ABCDs intact.

## 2020-06-08 NOTE — ED NOTES
Bed: ED39  Expected date: 6/8/20  Expected time: 6:02 PM  Means of arrival: Ambulance  Comments:  [address] 595: 84yo Male, vertigo, nausea & vomiting

## 2020-06-09 ENCOUNTER — APPOINTMENT (OUTPATIENT)
Dept: CARDIOLOGY | Facility: CLINIC | Age: 85
End: 2020-06-09
Attending: PHYSICIAN ASSISTANT
Payer: COMMERCIAL

## 2020-06-09 ENCOUNTER — APPOINTMENT (OUTPATIENT)
Dept: OCCUPATIONAL THERAPY | Facility: CLINIC | Age: 85
End: 2020-06-09
Attending: INTERNAL MEDICINE
Payer: COMMERCIAL

## 2020-06-09 PROBLEM — I63.9 STROKE (H): Status: ACTIVE | Noted: 2020-06-09

## 2020-06-09 LAB
GLUCOSE BLDC GLUCOMTR-MCNC: 110 MG/DL (ref 70–99)
GLUCOSE BLDC GLUCOMTR-MCNC: 110 MG/DL (ref 70–99)
GLUCOSE BLDC GLUCOMTR-MCNC: 111 MG/DL (ref 70–99)
INTERPRETATION ECG - MUSE: NORMAL
LDLC SERPL DIRECT ASSAY-MCNC: 144 MG/DL
MAGNESIUM SERPL-MCNC: 2 MG/DL (ref 1.6–2.3)
PHOSPHATE SERPL-MCNC: 3.4 MG/DL (ref 2.5–4.5)
PLATELET FUNCTION ASA: 521 ARU
PLATELET REACTIVITY P2Y12: 162 PRU
SARS-COV-2 RNA SPEC QL NAA+PROBE: NOT DETECTED
SPECIMEN SOURCE: NORMAL

## 2020-06-09 PROCEDURE — 25000132 ZZH RX MED GY IP 250 OP 250 PS 637: Performed by: INTERNAL MEDICINE

## 2020-06-09 PROCEDURE — 93306 TTE W/DOPPLER COMPLETE: CPT

## 2020-06-09 PROCEDURE — G0378 HOSPITAL OBSERVATION PER HR: HCPCS

## 2020-06-09 PROCEDURE — 97165 OT EVAL LOW COMPLEX 30 MIN: CPT | Mod: GO

## 2020-06-09 PROCEDURE — 00000146 ZZHCL STATISTIC GLUCOSE BY METER IP

## 2020-06-09 PROCEDURE — 97530 THERAPEUTIC ACTIVITIES: CPT | Mod: GO

## 2020-06-09 PROCEDURE — 40000895 ZZH STATISTIC SLP IP EVAL DEFER

## 2020-06-09 PROCEDURE — 83735 ASSAY OF MAGNESIUM: CPT | Performed by: INTERNAL MEDICINE

## 2020-06-09 PROCEDURE — 97535 SELF CARE MNGMENT TRAINING: CPT | Mod: GO

## 2020-06-09 PROCEDURE — 36415 COLL VENOUS BLD VENIPUNCTURE: CPT | Performed by: PHYSICIAN ASSISTANT

## 2020-06-09 PROCEDURE — 25000132 ZZH RX MED GY IP 250 OP 250 PS 637: Performed by: PHYSICIAN ASSISTANT

## 2020-06-09 PROCEDURE — 93306 TTE W/DOPPLER COMPLETE: CPT | Mod: 26 | Performed by: INTERNAL MEDICINE

## 2020-06-09 PROCEDURE — G0426 INPT/ED TELECONSULT50: HCPCS | Mod: G0 | Performed by: PHYSICIAN ASSISTANT

## 2020-06-09 PROCEDURE — 85576 BLOOD PLATELET AGGREGATION: CPT | Performed by: INTERNAL MEDICINE

## 2020-06-09 PROCEDURE — 36415 COLL VENOUS BLD VENIPUNCTURE: CPT | Performed by: INTERNAL MEDICINE

## 2020-06-09 PROCEDURE — 85576 BLOOD PLATELET AGGREGATION: CPT | Performed by: PHYSICIAN ASSISTANT

## 2020-06-09 PROCEDURE — 99226 ZZC SUBSEQUENT OBSERVATION CARE,LEVEL III: CPT | Performed by: INTERNAL MEDICINE

## 2020-06-09 RX ORDER — ASPIRIN 81 MG/1
81 TABLET, CHEWABLE ORAL DAILY
Status: DISCONTINUED | OUTPATIENT
Start: 2020-06-09 | End: 2020-06-10 | Stop reason: HOSPADM

## 2020-06-09 RX ORDER — DEXTROSE MONOHYDRATE 25 G/50ML
25-50 INJECTION, SOLUTION INTRAVENOUS
Status: DISCONTINUED | OUTPATIENT
Start: 2020-06-09 | End: 2020-06-10 | Stop reason: HOSPADM

## 2020-06-09 RX ORDER — ASPIRIN 81 MG/1
81 TABLET, CHEWABLE ORAL DAILY
Status: DISCONTINUED | OUTPATIENT
Start: 2020-06-10 | End: 2020-06-09

## 2020-06-09 RX ORDER — AMOXICILLIN 250 MG
2 CAPSULE ORAL 2 TIMES DAILY PRN
Status: DISCONTINUED | OUTPATIENT
Start: 2020-06-09 | End: 2020-06-10 | Stop reason: HOSPADM

## 2020-06-09 RX ORDER — ONDANSETRON 4 MG/1
4 TABLET, ORALLY DISINTEGRATING ORAL EVERY 6 HOURS PRN
Status: DISCONTINUED | OUTPATIENT
Start: 2020-06-09 | End: 2020-06-10 | Stop reason: HOSPADM

## 2020-06-09 RX ORDER — CLOPIDOGREL BISULFATE 75 MG/1
75 TABLET ORAL DAILY
Status: DISCONTINUED | OUTPATIENT
Start: 2020-06-09 | End: 2020-06-10 | Stop reason: HOSPADM

## 2020-06-09 RX ORDER — ACETAMINOPHEN 325 MG/1
650 TABLET ORAL EVERY 4 HOURS PRN
Status: DISCONTINUED | OUTPATIENT
Start: 2020-06-09 | End: 2020-06-10 | Stop reason: HOSPADM

## 2020-06-09 RX ORDER — LIDOCAINE 40 MG/G
CREAM TOPICAL
Status: DISCONTINUED | OUTPATIENT
Start: 2020-06-09 | End: 2020-06-10 | Stop reason: HOSPADM

## 2020-06-09 RX ORDER — GABAPENTIN 300 MG/1
600 CAPSULE ORAL AT BEDTIME
Status: DISCONTINUED | OUTPATIENT
Start: 2020-06-09 | End: 2020-06-10 | Stop reason: HOSPADM

## 2020-06-09 RX ORDER — HYDRALAZINE HYDROCHLORIDE 20 MG/ML
10-20 INJECTION INTRAMUSCULAR; INTRAVENOUS
Status: DISCONTINUED | OUTPATIENT
Start: 2020-06-09 | End: 2020-06-10 | Stop reason: HOSPADM

## 2020-06-09 RX ORDER — LABETALOL 20 MG/4 ML (5 MG/ML) INTRAVENOUS SYRINGE
10-40 EVERY 10 MIN PRN
Status: DISCONTINUED | OUTPATIENT
Start: 2020-06-09 | End: 2020-06-10 | Stop reason: HOSPADM

## 2020-06-09 RX ORDER — AMOXICILLIN 250 MG
1 CAPSULE ORAL 2 TIMES DAILY PRN
Status: DISCONTINUED | OUTPATIENT
Start: 2020-06-09 | End: 2020-06-10 | Stop reason: HOSPADM

## 2020-06-09 RX ORDER — BISACODYL 10 MG
10 SUPPOSITORY, RECTAL RECTAL DAILY PRN
Status: DISCONTINUED | OUTPATIENT
Start: 2020-06-09 | End: 2020-06-10 | Stop reason: HOSPADM

## 2020-06-09 RX ORDER — ONDANSETRON 2 MG/ML
4 INJECTION INTRAMUSCULAR; INTRAVENOUS EVERY 6 HOURS PRN
Status: DISCONTINUED | OUTPATIENT
Start: 2020-06-09 | End: 2020-06-10 | Stop reason: HOSPADM

## 2020-06-09 RX ORDER — PROCHLORPERAZINE 25 MG
12.5 SUPPOSITORY, RECTAL RECTAL EVERY 12 HOURS PRN
Status: DISCONTINUED | OUTPATIENT
Start: 2020-06-09 | End: 2020-06-10 | Stop reason: HOSPADM

## 2020-06-09 RX ORDER — ASPIRIN 325 MG
325 TABLET ORAL DAILY
Status: DISCONTINUED | OUTPATIENT
Start: 2020-06-09 | End: 2020-06-09

## 2020-06-09 RX ORDER — NALOXONE HYDROCHLORIDE 0.4 MG/ML
.1-.4 INJECTION, SOLUTION INTRAMUSCULAR; INTRAVENOUS; SUBCUTANEOUS
Status: DISCONTINUED | OUTPATIENT
Start: 2020-06-09 | End: 2020-06-10 | Stop reason: HOSPADM

## 2020-06-09 RX ORDER — PROCHLORPERAZINE MALEATE 5 MG
5 TABLET ORAL EVERY 6 HOURS PRN
Status: DISCONTINUED | OUTPATIENT
Start: 2020-06-09 | End: 2020-06-10 | Stop reason: HOSPADM

## 2020-06-09 RX ORDER — POLYETHYLENE GLYCOL 3350 17 G/17G
17 POWDER, FOR SOLUTION ORAL DAILY PRN
Status: DISCONTINUED | OUTPATIENT
Start: 2020-06-09 | End: 2020-06-10 | Stop reason: HOSPADM

## 2020-06-09 RX ORDER — NICOTINE POLACRILEX 4 MG
15-30 LOZENGE BUCCAL
Status: DISCONTINUED | OUTPATIENT
Start: 2020-06-09 | End: 2020-06-10 | Stop reason: HOSPADM

## 2020-06-09 RX ADMIN — CLOPIDOGREL BISULFATE 75 MG: 75 TABLET ORAL at 12:28

## 2020-06-09 RX ADMIN — ACETAMINOPHEN 650 MG: 325 TABLET, FILM COATED ORAL at 00:37

## 2020-06-09 RX ADMIN — OMEPRAZOLE 20 MG: 20 CAPSULE, DELAYED RELEASE ORAL at 12:28

## 2020-06-09 RX ADMIN — ASPIRIN 81 MG 81 MG: 81 TABLET ORAL at 13:17

## 2020-06-09 RX ADMIN — GABAPENTIN 600 MG: 300 CAPSULE ORAL at 21:04

## 2020-06-09 RX ADMIN — ACETAMINOPHEN 650 MG: 325 TABLET, FILM COATED ORAL at 10:06

## 2020-06-09 RX ADMIN — ACETAMINOPHEN 650 MG: 325 TABLET, FILM COATED ORAL at 21:04

## 2020-06-09 ASSESSMENT — ACTIVITIES OF DAILY LIVING (ADL)
ADLS_ACUITY_SCORE: 13
PREVIOUS_RESPONSIBILITIES: MEAL PREP;HOUSEKEEPING;SHOPPING;LAUNDRY;YARDWORK;MEDICATION MANAGEMENT;FINANCES;DRIVING
ADLS_ACUITY_SCORE: 13
ADLS_ACUITY_SCORE: 13

## 2020-06-09 NOTE — PROGRESS NOTES
"   06/09/20 0819   Quick Adds   Type of Visit Initial Occupational Therapy Evaluation   Living Environment   Lives With alone   Living Arrangements mobile home   Home Accessibility stairs to enter home   Number of Stairs, Main Entrance 3   Transportation Anticipated car, drives self;family or friend will provide   Living Environment Comment Pt lives alone in mobile home, 3 stairs to enter with rail, walk in shower, standard height toilet   Self-Care   Usual Activity Tolerance good   Current Activity Tolerance good   Equipment Currently Used at Home none   Functional Level   Ambulation 0-->independent   Transferring 0-->independent   Toileting 0-->independent   Bathing 0-->independent   Dressing 0-->independent   Eating 0-->independent   Communication 0-->understands/communicates without difficulty   Swallowing 0-->swallows foods/liquids without difficulty   Cognition 0 - no cognition issues reported   Fall history within last six months no   Prior Functional Level Comment Pt reports indep in all ADLs, IADLs and mobility tasks with no AD at baseline.    General Information   Onset of Illness/Injury or Date of Surgery - Date 06/08/20   Referring Physician Delisa Zelaya,     Patient/Family Goals Statement Pt's goal is to d/c home   Additional Occupational Profile Info/Pertinent History of Current Problem Per chart: Pt is an 85 year old male presenting with dizziness, found to have CVA in right occipital and right parietal lobes. PMH significant for CVA, BPPV.    Precautions/Limitations fall precautions   Cognitive Status Examination   Orientation orientation to person, place and time   Level of Consciousness alert   Visual Perception   Visual Perception Wears glasses  (at all times)   Visual Perception Comments Mild delay/difficulty in L upper quadrant scanning, pursuits difficult. Able to read various font size/print. Previous CVA resulted in double vision, reports \"resolved on its own\"   Sensory Examination " "  Sensory Comments Pt denies numbness/tingling in BUEs   Pain Assessment   Patient Currently in Pain Yes, see Vital Sign flowsheet  (headache pain, nausea\")   Range of Motion (ROM)   ROM Comment BUEs WFL   Strength   Strength Comments BUEs WFL   Hand Strength   Hand Strength Comments WFL   Coordination   Upper Extremity Coordination No deficits were identified   Transfer Skills   Transfer Comments SBA/supervision for transfers/mobility due to report of mild \"lightheadedness\"   Transfer Skill: Bed to Chair/Chair to Bed   Physical Assist/Nonphysical Assist: Bed to Chair supervision;1 person assist   Transfer Skill: Sit to Stand   Physical Assist/Nonphysical Assist: Sit/Stand supervision;1 person assist   Transfer Skill: Toilet Transfer   Physical Assist/Nonphysical Assist: Toilet supervision;1 person assist   Lower Body Dressing   Level of [place]: Dress Lower Body stand-by assist   Physical Assist/Nonphysical Assist: Dress Lower Body 1 person assist   Toileting   Physical Assist/Nonphysical Assist: Toilet supervision;1 person assist   Grooming   Physical Assist/Nonphysical Assist: Grooming supervision;1 person assist   Instrumental Activities of Daily Living (IADL)   Previous Responsibilities meal prep;housekeeping;shopping;laundry;yardwork;medication management;finances;driving   Activities of Daily Living Analysis   Impairments Contributing to Impaired Activities of Daily Living balance impaired;pain   ADL Comments Pt near baseline level of functioning with regards to ADLs   General Therapy Interventions   Planned Therapy Interventions ADL retraining;IADL retraining;visual perception;transfer training   Clinical Impression   Criteria for Skilled Therapeutic Interventions Met yes, treatment indicated   OT Diagnosis Impaired IADLs   Influenced by the following impairments Mild vision deficit   Assessment of Occupational Performance 1-3 Performance Deficits   Identified Performance Deficits IADLs   Clinical " "Decision Making (Complexity) Low complexity   Therapy Frequency Daily   Predicted Duration of Therapy Intervention (days/wks) 3 days   Anticipated Discharge Disposition Home with Outpatient Therapy   Risks and Benefits of Treatment have been explained. Yes   Patient, Family & other staff in agreement with plan of care Yes   Clinical Impression Comments Pt would benefit from skilled OT to maximize safety and indep in all ADLs, IADLs and mobility tasks due to current deficits impacting function   [facility] AM-PAC  \"6 Clicks\" Daily Activity Inpatient Short Form   1. Putting on and taking off regular lower body clothing? 3 - A Little   2. Bathing (including washing, rinsing, drying)? 3 - A Little   3. Toileting, which includes using toilet, bedpan or urinal? 3 - A Little   4. Putting on and taking off regular upper body clothing? 3 - A Little   5. Taking care of personal grooming such as brushing teeth? 4 - None   6. Eating meals? 4 - None   Daily Activity Raw Score (Score out of 24.Lower scores equate to lower levels of function) 20   Total Evaluation Time   Total Evaluation Time (Minutes) 10     "

## 2020-06-09 NOTE — PLAN OF CARE
Discharge Planner SLP   Patient plan for discharge: Did not discuss  Current status: Chart reviewed and discussed with pt and RN. Pt denies any changes in his cognition, speech, or language. He also denies any difficulty swallowing saying he at a sandwich last night without any s/sx of dysphagia. Pt consumed sips of water during our conversation without any overt s/sx of aspiration.     Defer SLP evaluation. Pt reports being at baseline in the areas of swallow function, speech, language and cognition.     Barriers to return to prior living situation: None per SLP  Recommendations for discharge: No SLP evaluation warranted. Defer to PT/OT recommendations  Rationale for recommendations: No SLP evaluation warranted.          Entered by: Valerie Loaiza 06/09/2020 9:09 AM

## 2020-06-09 NOTE — PROGRESS NOTES
PT: Spoke with evaluating OT who reports pt has bee n able to mobilize with SBA/supervision. Anticipating no IP PT needs. Will continue to follow and will evaluate if PT needs arise.

## 2020-06-09 NOTE — PROGRESS NOTES
[facility]  Hospitalist Progress Note      Assessment & Plan   Summary of stay: Edgardo Rice is a 85 year old male with a past medical history of BPPV, reflux esophagitis, hyperlipidemia, hypertension, BPH, coronary artery disease, and CVA who presents to [facility] ED on 6/8/2020 via EMS with dizziness.    MRI of the brain showed a small area of restricted diffusion in the right occipital and right parietal lobes.  Findings consistent with recent infarct.  CTA head showed short segment moderate narrowing of the distal right P1/proximal P2 similar to prior study.  Neck CTA showed post left endarterectomy.  Mild narrowing without significance.    Patient had new findings on imaging and stroke neurology was consulted on admission.    Cerebrovascular accident in the right occipital and right parietal lobes  - Neuro stroke consulted in the ED- awaiting recommendations  -Tele monitoring -no acute events overnight; TTE 6/9/20 results pending - TTE 3/20/20 completed without acute abn EF 55%  -Neuro checks q 4 hours  -Permissive hypertension for 24 to 48 hours goal less than 220/110  - Aspirin 325 mg: Plavix 75 mg daily-continued from home medications.  Patient had a CVA in March 2020. Defer to neurology additional medication changes.   -Lipid panel reviewed: Cholesterol 221, triglycerides 116, HDL 43, LDL not reported in March 2020-currently not on statin therapy and will defer to neuro stroke if statin should be started at this time. LDL pending Allergies list atorvastatin.  - A1c 5.6 in March 2020     Asymptomatic COVID  -6/8/20 COVID-19 Negative     Insomnia  -Gabapentin 600 mg qHS     Hypertension  -Hold lisinopril 40 mg daily; hold metoprolol 25 mg twice daily; hold amlodipine 10 mg nightly - plan to resume in AM     Asymptomatic bradycardia  -Heart rate in the ER 48-50.  Hold metoprolol 25 mg twice daily     Reflux esophagitis  -omeprazole 20 mg daily    FEN: Oral hydration; monitor; regular  Activity:  PT/OT - outpatient OT  DVT Prophylaxis: Pneumatic Compression Devices  Code Status: DNI  Expected discharge: Tomorrow, recommended to prior living arrangement once neurology work-up completed. .    Delisa Zelaya DO  Text Page (7am - 6pm, M-F)    Interval History   Patient's symptoms have resolved.  Continues to have a mild dull headache.  No chest pain or palpitations.  No shortness of breath or cough.  Eager to return home.    -Data reviewed today: I reviewed all new labs and imaging results over the last 24 hours. I personally reviewed     Physical Exam   Temp: 96.7  F (35.9  C) Temp src: Oral BP: (!) 146/52 Pulse: 60 Heart Rate: 61 Resp: 18 SpO2: 93 % O2 Device: None (Room air) Oxygen Delivery: 2 LPM  Vitals:    06/08/20 1826   Weight: 70.3 kg (155 lb)     Vital Signs with Ranges  Temp:  [95.5  F (35.3  C)-98.6  F (37  C)] 96.7  F (35.9  C)  Pulse:  [49-60] 60  Heart Rate:  [49-61] 61  Resp:  [8-23] 18  BP: (144-197)/(46-80) 146/52  SpO2:  [91 %-100 %] 93 %  No intake/output data recorded.    Constitutional: Awake, alert, cooperative, no apparent distress.  HEENT: AT. NC. Conjunctiva non-injected. Sclera anicteric. Pupils examined and normal. MMM.   Respiratory: No use of accessory respiratory muscles. Clear to auscultation bilaterally, no crackles or wheezing.  Cardiovascular: Regular rate and rhythm, normal S1 and S2, and no murmur noted. Left-sided carotid murmur  GI: Soft, non-distended, non-tender, normal bowel sounds. No organomegaly.   Skin: No rashes, no cyanosis, no edema.  Neurologic: Cranial nerves 2-12 intact, normal strength and sensation.  Psychiatric: Alert, oriented to person, place and time, no obvious anxiety or depression.    Medications       aspirin  81 mg Oral Daily     clopidogrel  75 mg Oral Daily     gabapentin  600 mg Oral At Bedtime     insulin aspart  1-7 Units Subcutaneous BID     insulin aspart  1-5 Units Subcutaneous At Bedtime     omeprazole  20 mg Oral Daily     sodium chloride  (PF)  3 mL Intracatheter Q8H     Data   Recent Labs   Lab 06/08/20  1828   WBC 6.6   HGB 13.5   MCV 93         POTASSIUM 4.0   CHLORIDE 103   CO2 28   BUN 19   CR 0.92   ANIONGAP 3   MAO 8.6   *   ALBUMIN 3.7   PROTTOTAL 7.2   BILITOTAL 0.5   ALKPHOS 78   ALT 23   AST 21   TROPI <0.015     Recent Results (from the past 24 hour(s))   MR Brain w/o & w Contrast    Narrative    EXAM: MR BRAIN W/O and W CONTRAST  LOCATION: [facility]  DATE/TIME: 6/8/2020 7:49 PM    INDICATION: Dizziness nausea and vomiting.  COMPARISON: None.  CONTRAST: 7.5 mL Gadavist  TECHNIQUE: Routine multiplanar multisequence head MRI without and with intravenous contrast.    FINDINGS:  INTRACRANIAL CONTENTS: Diffusion-weighted images reveal a small area of restricted diffusion measuring about 3 mm in diameter in the right occipital lobe. There is also a small, 3 mm area of restricted diffusion in the right parietal lobe. A second area   of restricted diffusion measuring about 5 mm in diameter is also seen in the right parietal lobe. These areas of restricted diffusion are consistent with recent infarcts. No mass, acute hemorrhage, or extra-axial fluid collections. There is diffuse   parenchymal volume loss. White matter changes are present in the cerebral hemispheres that are consistent with small vessel ischemic disease in this age patient. Normal position of the cerebellar tonsils. No pathologic contrast enhancement.    SELLA: No abnormality accounting for technique.    OSSEOUS STRUCTURES/SOFT TISSUES: Normal marrow signal. The major intracranial vascular flow voids are maintained.     ORBITS: No abnormality accounting for technique.     SINUSES/MASTOIDS: No paranasal sinus mucosal disease. No middle ear or mastoid effusion.       Impression    IMPRESSION:  1.  Small areas of restricted diffusion are seen in the right occipital and right parietal lobes. These are consistent with recent infarcts.  2.  There is  diffuse parenchymal volume loss. White matter changes are present in the cerebral hemispheres that are consistent with small vessel ischemic disease in this age patient.       XR Chest Port 1 View    Narrative    EXAM: XR CHEST PORT 1 VW  LOCATION: [facility]  DATE/TIME: 6/8/2020 8:34 PM    INDICATION: Shortness of breath  COMPARISON: 10/14/2007      Impression    IMPRESSION: Low lung volumes. Heart size and pulmonary vascularity normal. Slight elevation right hemidiaphragm. Lungs otherwise are clear. Surgical clips left neck.   CTA Head Neck with Contrast    Narrative    EXAM: CTA  HEAD NECK WITH CONTRAST  LOCATION: [facility]  DATE/TIME: 6/8/2020 10:14 PM    INDICATION: Small acute infarcts, dizziness  COMPARISON: MRI head 06/08/2020, MRA head 03/20/2020  CONTRAST: 70mL Isovue-370  TECHNIQUE: Head and neck CT angiogram with IV contrast. Axial helical CT images of the head and neck vessels obtained during the arterial phase of intravenous contrast administration. Axial 2D reconstructed images and multiplanar 3D MIP reconstructed   images of the head and neck vessels were performed by the technologist. Dose reduction techniques were used. All stenosis measurements made according to NASCET criteria unless otherwise specified.    FINDINGS:   HEAD CTA:  ANTERIOR CIRCULATION: Mild atherosclerotic changes, similar to prior. Standard [place] anatomy.    POSTERIOR CIRCULATION: Short segment moderate narrowing at the distal right P1/proximal right P2; similar to prior. Balanced vertebral arteries supply a normal basilar artery.     DURAL VENOUS SINUSES: Expected enhancement of the major dural venous sinuses.    NECK CTA:  RIGHT CAROTID: Less than 50% narrowing proximal right ICA    LEFT CAROTID: Postoperative changes endarterectomy. Mild, focal, less than 50% narrowing at the distal left common carotid artery without hemodynamically significant narrowing in the proximal left  ICA.    VERTEBRAL ARTERIES: Mild narrowing at the origin of right vertebral artery. Balanced vertebral arteries.    AORTIC ARCH: Classic aortic arch anatomy with no significant stenosis at the origin of the great vessels.    NONVASCULAR STRUCTURES: Unremarkable.      Impression    IMPRESSION:   HEAD CTA:   1.  Short segment moderate narrowing distal right P1/proximal P2, similar to prior.  2.  Mild atherosclerotic changes throughout anterior circulation, similar to prior.    NECK CTA:  1.  Post left endarterectomy.  2.  Mild narrowings, as described; without evidence of hemodynamically significant narrowing.

## 2020-06-09 NOTE — PROGRESS NOTES
Patient transferred from 5th. Patient tentatively going home tomorrow. Pending Neuros final recommendations.  Patient is at baseline. Resting in bed.

## 2020-06-09 NOTE — ED NOTES
Patient called his daughter and informed her of his plan for admission.  Her name is Namita, her number is [phone].

## 2020-06-09 NOTE — PLAN OF CARE
"PRIMARY DIAGNOSIS: \"GENERIC\" NURSING  OUTPATIENT/OBSERVATION GOALS TO BE MET BEFORE DISCHARGE:  1. ADLs back to baseline: YES    2. Activity and level of assistance: SBA    3. Pain status: HA    Return to near baseline physical activity: YES  Discharge Planner Nurse   Safe discharge environment identified:YES  Barriers to discharge: Waiting for neurology recommendations & follow up care       Entered by: Sharona Hutchinson 06/09/2020 6:11 PM     Please review provider order for any additional goals.   Nurse to notify provider when observation goals have been met and patient is ready for discharge.  "

## 2020-06-09 NOTE — PHARMACY-ADMISSION MEDICATION HISTORY
Admission medication history interview status for this patient is complete. See [facility] admission navigator for allergy information, prior to admission medications and immunization status.     Medication history interview done via telephone during Covid-19 pandemic, indicate source(s): Patient  Medication history resources (including written lists, pill bottles, clinic record):None    Changes made to PTA medication list:  Added: none  Deleted: none  Changed: nonee    Actions taken by pharmacist (provider contacted, etc):None     Additional medication history information:None    Medication reconciliation/reorder completed by provider prior to medication history?  Yes  (Y/N)     For patients on insulin therapy: No  (Y/N)      Prior to Admission medications    Medication Sig Last Dose Taking? Auth Provider   acetaminophen (TYLENOL) 500 MG tablet Take 1,000 mg by mouth At Bedtime 6/7/2020 Yes Unknown, Entered By History   amLODIPine (NORVASC) 10 MG tablet Take 10 mg by mouth At Bedtime 6/7/2020 Yes Unknown, Entered By History   aspirin (ASA) 325 MG tablet Take 1 tablet (325 mg) by mouth daily 6/8/2020 at Unknown time Yes Jose Roberto Gipson, DO   clopidogrel (PLAVIX) 75 MG tablet Take 1 tablet (75 mg) by mouth daily 6/8/2020 at Unknown time Yes Jose Roberto Gipson, DO   gabapentin (NEURONTIN) 300 MG capsule Take 600 mg by mouth At Bedtime 6/7/2020 Yes Unknown, Entered By History   lisinopril (ZESTRIL) 20 MG tablet Take 40 mg by mouth daily 6/8/2020 at Unknown time Yes Unknown, Entered By History   metoprolol tartrate (LOPRESSOR) 25 MG tablet Take 25 mg by mouth 2 times daily 6/8/2020 at Unknown time Yes Unknown, Entered By History   multivitamin w/minerals (THERA-VIT-M) tablet Take 1 tablet by mouth daily 6/8/2020 at Unknown time Yes Unknown, Entered By History   omeprazole (PRILOSEC) 20 MG DR capsule Take 20 mg by mouth daily 6/8/2020 at Unknown time Yes Unknown, Entered By History

## 2020-06-09 NOTE — PLAN OF CARE
"OT: Order received, eval completed and treatment initiated. Pt is an 85 year old male presenting with dizziness, found to have CVA in right occipital and right parietal lobes. PMH significant for CVA, BPPV. Pt lives alone in mobile home, 3 stairs to enter with rail, walk in shower, standard height toilet. Pt reports indep in all ADLs, IADLs and mobility tasks with no AD at baseline.    Discharge Planner OT   Patient plan for discharge: Home  Current status: Pt completed transfers/mobility in room with no assistive device and SBA/supervision. /50, HR 60 at rest; /47, HR 64 post activity. Pt with report of \"mild lightheadedness\" during activity. Pt able to perform LB dressing and toileting tasks with SBA/supervision. No overt strength deficits in BUEs/BLES, denies sensory deficits. Vision screen: Mild delay/difficulty in L upper quadrant scanning, pursuits difficult. Able to read various font size/print. Previous CVA resulted in double vision, reports \"resolved on its own.\" Likely would benefit from OP OT vision assessment. Pt A&Ox4. Reports \"mild headache\" pain.    Barriers to return to prior living situation: Mild vision deficit  Recommendations for discharge: Home w/ OP OT for vision rehab  Rationale for recommendations: Pt near baseline level of functioning with regards to ADLs/mobility, limited by mild lightheadedness. Recommend ongoing skilled OT while IP and in OP setting for more in-depth vision assessment.        Entered by: Mercedes Mckinnon 06/09/2020 8:52 AM       "

## 2020-06-09 NOTE — ED NOTES
"[facility]  ED Nurse Handoff Report    Edgardo Rice is a 85 year old male   ED Chief complaint: dizziness, nausea, recent stroke  . ED Diagnosis:   Final diagnoses:   Cerebrovascular accident (H)   Dizziness   Nausea and vomiting     Allergies:   Allergies   Allergen Reactions     Atorvastatin      Cholestyramine      Diphenhydramine      Droperidol      Morphine      Norco [Hydrocodone-Acetaminophen]      Sulfa Drugs      Tramadol      Tylenol With Codeine [Acetaminophen-Codeine]        Code Status: Full Code  Activity level - Baseline/Home:  Independent. Activity Level - Current:   Assist X 2. Lift room needed: No. Bariatric: No   Needed: No   Isolation: No. Infection: Not Applicable.     Vital Signs:   Vitals:    06/08/20 1826   BP: (!) 196/72   Pulse: (!) 49   Resp: 20   SpO2: 93%   Weight: 70.3 kg (155 lb)   Height: 1.676 m (5' 6\")       Cardiac Rhythm:  ,      Pain level:    Patient confused: No. Patient Falls Risk: Yes.   Elimination Status: Has voided   Patient Report - Initial Complaint: woke with dizziness and nausea this morning. Focused Assessment: woke with dizziness and nausea this AM.  Has had intermittent vomiting with increased dizziness with any movement throughout the day.  History of vertigo, states he used to get it frequently but has not had an episode for about a year.  Had a stroke in March, only symptom was double vision.  ABCDs intact.   Tests Performed: MRI, CTA, labs. Abnormal Results:   Labs Ordered and Resulted from Time of ED Arrival Up to the Time of Departure from the ED   CBC WITH PLATELETS DIFFERENTIAL - Abnormal; Notable for the following components:       Result Value    Absolute Lymphocytes 0.7 (*)     All other components within normal limits   COMPREHENSIVE METABOLIC PANEL - Abnormal; Notable for the following components:    Glucose 190 (*)     All other components within normal limits   TROPONIN I   COVID-19 VIRUS (CORONAVIRUS) BY PCR   PERIPHERAL " IV CATHETER   CARDIAC CONTINUOUS MONITORING   Small recent infarct on MRI.   Treatments provided: fluids, zofran IV x 2, meclizine PO  Family Comments: Spoke with patient's daughterNamita brochure/video discussed/provided to patient:  N/A  ED Medications:   Medications   ondansetron (ZOFRAN) injection 4 mg (4 mg Intravenous Given 6/8/20 2059)   0.9% sodium chloride BOLUS (0 mLs Intravenous Stopped 6/8/20 2058)   meclizine (ANTIVERT) tablet 25 mg (25 mg Oral Given 6/8/20 1920)   gadobutrol (GADAVIST) injection 7.5 mL (7.5 mLs Intravenous Given 6/8/20 2005)     Drips infusing:  No  For the majority of the shift, the patient's behavior Green. Interventions performed were n/a.    Sepsis treatment initiated: No       ED Nurse Name/Phone Number: Jus Lyons RN,   9:39 PM  *[phone]    RECEIVING UNIT ED HANDOFF REVIEW    Above ED Nurse Handoff Report was reviewed: Yes  Reviewed by: Zenaida Chan RN on June 8, 2020 at 11:41 PM

## 2020-06-09 NOTE — H&P
[facility]  History and Physical Hospitalist       Date of Admission:  6/8/2020    Assessment & Plan   Edgardo Rice is a 85 year old male with a past medical history of BPPV, reflux esophagitis, hyperlipidemia, hypertension, BPH, coronary artery disease, and CVA who presents to [facility] ED on 6/8/2020 via EMS with dizziness.    Cerebrovascular accident in the right occipital and right parietal lobes  - Neuro stroke consulted in the ED-plan to evaluate patient in the a.m. - hold on additional imaging at this time - MRI and CTA head/neck completed in ED  -Tele monitoring - defer to Am if TTE indicated   -PT/OT/SLP  -NPO until bedside nursing swallow study completed  -Neuro checks q 4 hours  -Permissive hypertension for 24 to 48 hours goal less than 220/110  - Aspirin 325 mg: Plavix 75 mg daily-continued from home medications.  Patient had a CVA in March 2020.  -Lipid panel reviewed: Cholesterol 221, triglycerides 116, HDL 43, LDL not reported in March 2020-currently not on statin therapy and will defer to neuro stroke if statin should be started at this time.  Allergies list atorvastatin.  - A1c 5.6 in March 2020    Asymptomatic COVID  -Testing ordered per-hospital protocol. COVID-19 pending    Insomnia  -Gabapentin 600 mg qHS    Hypertension  -Hold lisinopril 40 mg daily; hold metoprolol 25 mg twice daily; hold amlodipine 10 mg nightly    Asymptomatic bradycardia  -Heart rate in the ER 48-50.  Hold metoprolol 25 mg twice daily    Reflux esophagitis  -omeprazole 20 mg daily    FEN: Oral hydration; check mag/phosphorus; NPO until bedside swallow completed   Activity: PT/OT/SLP - Up w/ assist  DVT Prophylaxis: Hold 24 hours while CVA workup started for possible hemorraghic transformation and already on DAP therapy; SCDs  Code Status: DNI    Expected discharge: 2 - 3 days, recommended to prior living arrangement once Neurology workup completed.    Delisa Zelaya DO    Primary Care Physician   [place]  [facility]    Chief Complaint   Dizziness  History is obtained from the patient, electronic health record and emergency department physician    History of Present Illness   Edgardo Rice is a 85 year old male with a past medical history of BPPV, reflux esophagitis, hyperlipidemia, hypertension, BPH, coronary artery disease, and CVA who presents to [facility] ED on 6/8/2020 via EMS with dizziness.    Patient acutely developed dizziness this morning at 0730.  Symptoms progressed to a spinning sensation made worse with movement of his head.  This progressed to nausea with vomiting and headache.  Difficulty with balance throughout the day need to stabilize by grabbing objects nearby.  He has a history of vertigo many years ago and symptoms currently feels similar to prior.    Of note, he is on aspirin and Plavix for stroke earlier this year.  At that time he had double vision.  Reports that symptoms are not similar to when he had a stroke.      He denied numbness, tingling, focal weakness, speech difficulties, or visual disturbances. No dysphagia, chest pain, palpitations, abdominal pain, dysuria or hematuria.     Past Medical History    I have reviewed this patient's medical history and updated it with pertinent information if needed.   Past Medical History:   Diagnosis Date     BPH (benign prostatic hyperplasia)      BPPV (benign paroxysmal positional vertigo)      CAD (coronary artery disease)      CVA (cerebral vascular accident) (H)      Diverticulitis      HLD (hyperlipidemia)      Hypertension      Insomnia      Macular degeneration      Reflux esophagitis      Past Surgical History   I have reviewed this patient's surgical history and updated it with pertinent information if needed.  Past Surgical History:   Procedure Laterality Date     ARTHROSCOPY KNEE Right      CAROTID ENDARTERECTOMY       CHOLECYSTECTOMY       HEMORRHOIDECTOMY       HERNIA REPAIR       HERNIA REPAIR       KERATOPLASTY PENETRATING,  VITRECTOMY ANTERIOR, EXTRACAPSULAR CATARACT EXTRACTION, COMBINED Bilateral     W/ intraocular lens implant     Prior to Admission Medications   Prior to Admission Medications   Prescriptions Last Dose Informant Patient Reported? Taking?   acetaminophen (TYLENOL) 500 MG tablet  Self Yes No   Sig: Take 1,000 mg by mouth At Bedtime   amLODIPine (NORVASC) 10 MG tablet  Self Yes No   Sig: Take 10 mg by mouth At Bedtime   aspirin (ASA) 325 MG tablet   No No   Sig: Take 1 tablet (325 mg) by mouth daily   clopidogrel (PLAVIX) 75 MG tablet   No No   Sig: Take 1 tablet (75 mg) by mouth daily   gabapentin (NEURONTIN) 300 MG capsule  Self Yes No   Sig: Take 600 mg by mouth At Bedtime   lisinopril (ZESTRIL) 20 MG tablet  Self Yes No   Sig: Take 40 mg by mouth daily   metoprolol tartrate (LOPRESSOR) 25 MG tablet  Self Yes No   Sig: Take 25 mg by mouth 2 times daily   multivitamin w/minerals (THERA-VIT-M) tablet  Self Yes No   Sig: Take 1 tablet by mouth daily   omeprazole (PRILOSEC) 20 MG DR capsule  Self Yes No   Sig: Take 20 mg by mouth daily      Facility-Administered Medications: None     Allergies   Allergies   Allergen Reactions     Atorvastatin      Cholestyramine      Diphenhydramine      Droperidol      Morphine      Norco [Hydrocodone-Acetaminophen]      Sulfa Drugs      Tramadol      Tylenol With Codeine [Acetaminophen-Codeine]      Social History   I have reviewed this patient's social history and updated it with pertinent information if needed. Edgardo Rice: Former cigar smoker, quit 2014, Uses alcohol occasionally, and  (wife is in a nursing home)     Family History   Family history reviewed with patient and is noncontributory.    Review of Systems   The 10 point Review of Systems is negative other than noted in the HPI or here.    Physical Exam       BP: (!) 160/67 Pulse: 51 Heart Rate: 50 Resp: 18 SpO2: 99 %      Vital Signs with Ranges  Temp:  [98.6  F (37  C)] 98.6  F (37  C)  Pulse:  [49-57] 53  Heart  Rate:  [49-61] 51  Resp:  [8-23] 17  BP: (155-197)/(63-80) 160/67  SpO2:  [91 %-100 %] 98 %  155 lbs 0 oz    Constitutional: Awake, alert, cooperative, no apparent distress.  HEENT: AT. NC. Conjunctiva non-injected. Sclera anicteric. Pupils examined and normal. Dry mucus membranes with dark discoloration to tongue. Missing bottom teeth.    Respiratory: No use of accessory respiratory muscles. Clear to auscultation bilaterally, no crackles or wheezing.  Cardiovascular: Regular rate and rhythm, normal S1 and S2, and no murmur noted.  GI: Soft, non-distended, non-tender, normal bowel sounds. No organomegaly.   Lymph/Hematologic: No anterior cervical or supraclavicular adenopathy.  Skin: No rashes, no cyanosis, no edema.  Musculoskeletal: No joint swelling, erythema or tenderness.  Neurologic: Cranial nerves 2-12 intact, normal strength and sensation.  Psychiatric: Alert, oriented to person, place and time, no obvious anxiety or depression.    Data   Data reviewed today:  I personally reviewed     Recent Labs   Lab 06/08/20  1828   WBC 6.6   HGB 13.5   MCV 93         POTASSIUM 4.0   CHLORIDE 103   CO2 28   BUN 19   CR 0.92   ANIONGAP 3   MAO 8.6   *   ALBUMIN 3.7   PROTTOTAL 7.2   BILITOTAL 0.5   ALKPHOS 78   ALT 23   AST 21   TROPI <0.015     Recent Results (from the past 24 hour(s))   MR Brain w/o & w Contrast    Narrative    EXAM: MR BRAIN W/O and W CONTRAST  LOCATION: [facility]  DATE/TIME: 6/8/2020 7:49 PM    INDICATION: Dizziness nausea and vomiting.  COMPARISON: None.  CONTRAST: 7.5 mL Gadavist  TECHNIQUE: Routine multiplanar multisequence head MRI without and with intravenous contrast.    FINDINGS:  INTRACRANIAL CONTENTS: Diffusion-weighted images reveal a small area of restricted diffusion measuring about 3 mm in diameter in the right occipital lobe. There is also a small, 3 mm area of restricted diffusion in the right parietal lobe. A second area   of restricted diffusion  measuring about 5 mm in diameter is also seen in the right parietal lobe. These areas of restricted diffusion are consistent with recent infarcts. No mass, acute hemorrhage, or extra-axial fluid collections. There is diffuse   parenchymal volume loss. White matter changes are present in the cerebral hemispheres that are consistent with small vessel ischemic disease in this age patient. Normal position of the cerebellar tonsils. No pathologic contrast enhancement.    SELLA: No abnormality accounting for technique.    OSSEOUS STRUCTURES/SOFT TISSUES: Normal marrow signal. The major intracranial vascular flow voids are maintained.     ORBITS: No abnormality accounting for technique.     SINUSES/MASTOIDS: No paranasal sinus mucosal disease. No middle ear or mastoid effusion.       Impression    IMPRESSION:  1.  Small areas of restricted diffusion are seen in the right occipital and right parietal lobes. These are consistent with recent infarcts.  2.  There is diffuse parenchymal volume loss. White matter changes are present in the cerebral hemispheres that are consistent with small vessel ischemic disease in this age patient.       XR Chest Port 1 View    Narrative    EXAM: XR CHEST PORT 1 VW  LOCATION: [facility]  DATE/TIME: 6/8/2020 8:34 PM    INDICATION: Shortness of breath  COMPARISON: 10/14/2007      Impression    IMPRESSION: Low lung volumes. Heart size and pulmonary vascularity normal. Slight elevation right hemidiaphragm. Lungs otherwise are clear. Surgical clips left neck.   CTA Head Neck with Contrast    Narrative    EXAM: CTA  HEAD NECK WITH CONTRAST  LOCATION: [facility]  DATE/TIME: 6/8/2020 10:14 PM    INDICATION: Small acute infarcts, dizziness  COMPARISON: MRI head 06/08/2020, MRA head 03/20/2020  CONTRAST: 70mL [id number]  TECHNIQUE: Head and neck CT angiogram with IV contrast. Axial helical CT images of the head and neck vessels obtained during the arterial phase of  intravenous contrast administration. Axial 2D reconstructed images and multiplanar 3D MIP reconstructed   images of the head and neck vessels were performed by the technologist. Dose reduction techniques were used. All stenosis measurements made according to NASCET criteria unless otherwise specified.    FINDINGS:   HEAD CTA:  ANTERIOR CIRCULATION: Mild atherosclerotic changes, similar to prior. Standard [employer] anatomy.    POSTERIOR CIRCULATION: Short segment moderate narrowing at the distal right P1/proximal right P2; similar to prior. Balanced vertebral arteries supply a normal basilar artery.     DURAL VENOUS SINUSES: Expected enhancement of the major dural venous sinuses.    NECK CTA:  RIGHT CAROTID: Less than 50% narrowing proximal right ICA    LEFT CAROTID: Postoperative changes endarterectomy. Mild, focal, less than 50% narrowing at the distal left common carotid artery without hemodynamically significant narrowing in the proximal left ICA.    VERTEBRAL ARTERIES: Mild narrowing at the origin of right vertebral artery. Balanced vertebral arteries.    AORTIC ARCH: Classic aortic arch anatomy with no significant stenosis at the origin of the great vessels.    NONVASCULAR STRUCTURES: Unremarkable.      Impression    IMPRESSION:   HEAD CTA:   1.  Short segment moderate narrowing distal right P1/proximal P2, similar to prior.  2.  Mild atherosclerotic changes throughout anterior circulation, similar to prior.    NECK CTA:  1.  Post left endarterectomy.  2.  Mild narrowings, as described; without evidence of hemodynamically significant narrowing.

## 2020-06-09 NOTE — CONSULTS
Stroke Education Note    The following information has been reviewed with the patient:    1. Warning signs of stroke    2. Calling 911 if having warning signs of stroke    3. All modifiable risk factors: hypertension, CAD, atrial fib, diabetes, hypercholesterolemia, smoking, substance abuse, diet, physical inactivity, obesity, sleep apnea.    4. Patient's risk factors for stroke which include: HTN, HLD, CAD, previous stroke    5. Follow-up plan for after discharge    6. Discharge medications which include: Lisinopril    In addition, the PLC Stroke Class Handout has been given to the patient.    Learner's response to risk factors / lifestyle modification education: NA - Precontemplative     Mayte Tam RN

## 2020-06-09 NOTE — CONSULTS
"  [facility]    Stroke Consult Note    Reason for Consult:  \"CVA\"    Chief Complaint: dizziness, nausea, recent stroke       HPI  Edgardo Rice is an 85 year old man who presented to the hospital yesterday after becoming acutely dizzy in the morning. He also had vomiting and some headache. He reports that he feels much better today. He also had stroke back in 3/2020 and was started on DAPT for 90 days for possible symptomatic intracranial stenosis. He continues DAPT now without any side effects. He reports he was unable to complete full 30 day monitoring to screen for afib but the time he did complete (About 3 weeks) showed none.        Stroke Evaluation summarized:  MRI/Head CT: MRI brain shows small infarcts in the right occipital and right parietal lobes  Intracranial Vascular Imaging: CTA head shows Short segment moderate narrowing distal right P1/proximal P2, similar to prior.  Cervical Carotid and Vertebral Artery Vascular Imaging: CTA neck shows pt is post left CEA mild other narrowings  Echocardiogram: EF 60-65%, moderate diastolic dysfunction, pulmonary artery pressure significantly higher than previous echo. 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 labs for both show patient response appropriately to those drugs  - Start a statin for goal LDL 40-70. Important not only for recurrent stroke prevention but especially so with the amount of aortic arch plaque the patient has which can be a contributor in ESUS cases. Looks like allergy to atorvastatin is listed- will address with patient tomorrow  - Permissive HTN for 24 hours, then ok to treat slowly with long term goal of normotension goal <135/80 if " "tolerated  - [facility] stroke education consult  - PT/OT/ST  - Encourage patient to redo 30 day cardionet monitoring upon discharge. LAst time he reports he was unable to complete the full 30 days. He has a severely dilated LA and atrial fibrillation is certainly a concern.    Patient Follow-up    - in the next 1 week(s) with PCP   - Follow-up in Stroke Clinic (located at [facility], [phone]) with Dr. Callaway or Stroke Specialist ANGEL: Christy Escamilla PA-C, Anne Sanz, GARRY, Calista Calderon PA-C in 4-6 weeks. Referral placed in discharge orders section (note: the order states \"neurosurgery\")      Thank you for this consult. No further stroke evaluation is recommended, so we will sign off. Please contact us with any additional questions. We will discuss these final recs with the patient tomorrow however as most of those things were not completed when seeing patient earlier in the day.    Itzel Escamilla PA-C  Neurology  06/09/2020 7:01 PM  To page me or covering stroke neurology team member, click here: AMCOM  Choose \"On Call\" tab at top, then search dropdown box for \"Neurology Adult\" & press Enter, look for Neuro ICU/Stroke    _____________________________________________________    Past Medical History   Past Medical History:   Diagnosis Date     BPH (benign prostatic hyperplasia)      BPPV (benign paroxysmal positional vertigo)      CAD (coronary artery disease)      CVA (cerebral vascular accident) (H)      Diverticulitis      HLD (hyperlipidemia)      Hypertension      Insomnia      Macular degeneration      Reflux esophagitis      Past Surgical History   Past Surgical History:   Procedure Laterality Date     ARTHROSCOPY KNEE Right      CAROTID ENDARTERECTOMY       CHOLECYSTECTOMY       HEMORRHOIDECTOMY       HERNIA REPAIR       HERNIA REPAIR       KERATOPLASTY PENETRATING, VITRECTOMY ANTERIOR, EXTRACAPSULAR CATARACT EXTRACTION, COMBINED Bilateral     W/ intraocular lens implant     Medications "   Home Meds  Medication Sig   acetaminophen (TYLENOL) 500 MG tablet Take 1,000 mg by mouth At Bedtime   amLODIPine (NORVASC) 10 MG tablet Take 10 mg by mouth At Bedtime   aspirin (ASA) 325 MG tablet Take 1 tablet (325 mg) by mouth daily   clopidogrel (PLAVIX) 75 MG tablet Take 1 tablet (75 mg) by mouth daily   gabapentin (NEURONTIN) 300 MG capsule Take 600 mg by mouth At Bedtime   lisinopril (ZESTRIL) 20 MG tablet Take 40 mg by mouth daily   metoprolol tartrate (LOPRESSOR) 25 MG tablet Take 25 mg by mouth 2 times daily   multivitamin w/minerals (THERA-VIT-M) tablet Take 1 tablet by mouth daily   omeprazole (PRILOSEC) 20 MG DR capsule Take 20 mg by mouth daily       Scheduled Meds    aspirin  81 mg Oral Daily     clopidogrel  75 mg Oral Daily     gabapentin  600 mg Oral At Bedtime     insulin aspart  1-7 Units Subcutaneous BID     insulin aspart  1-5 Units Subcutaneous At Bedtime     omeprazole  20 mg Oral Daily     sodium chloride (PF)  3 mL Intracatheter Q8H       Infusion Meds      PRN Meds  acetaminophen, bisacodyl, glucose **OR** dextrose **OR** glucagon, hydrALAZINE, labetalol, lidocaine 4%, lidocaine (buffered or not buffered), melatonin, naloxone, ondansetron **OR** ondansetron, polyethylene glycol, prochlorperazine **OR** prochlorperazine **OR** prochlorperazine, senna-docusate **OR** senna-docusate, sodium chloride (PF)    Allergies   Allergies   Allergen Reactions     Atorvastatin      Cholestyramine      Diphenhydramine      Droperidol      Morphine      Norco [Hydrocodone-Acetaminophen]      Sulfa Drugs      Tramadol      Tylenol With Codeine [Acetaminophen-Codeine]      Family History   No family history on file.  Social History   Social History     Tobacco Use     Smoking status: Not on file   Substance Use Topics     Alcohol use: Not on file     Drug use: Not on file       Review of Systems   The 10 point Review of Systems is negative other than noted in the HPI or here.        PHYSICAL EXAMINATION    Temp:  [95.5  F (35.3  C)-99.1  F (37.3  C)] 99.1  F (37.3  C)  Pulse:  [49-60] 60  Heart Rate:  [50-61] 57  Resp:  [8-23] 18  BP: (139-192)/(46-80) 178/73  SpO2:  [91 %-100 %] 94 %    General:  patient lying in bed without any acute distress    HEENT:  normocephalic/atraumatic  Pulmonary:  no respiratory distress    Neurologic  Mental Status:  alert, oriented x 3, follows commands, speech clear and fluent, naming and repetition normal  Cranial Nerves:  visual fields intact (tested by nurse), EOMI with normal smooth pursuit, facial sensation intact and symmetric (tested by nurse), facial movements symmetric, hearing not formally tested but intact to conversation, no dysarthria, shoulder shrug equal bilaterally, tongue protrusion midline  Motor:  no abnormal movements, able to move all limbs antigravity spontaneously with no signs of hemiparesis observed, no pronator drift  Reflexes:  unable to test (telestroke)  Sensory:  light touch sensation intact and symmetric throughout upper and lower extremities (assessed by nurse), no extinction on double simultaneous stimulation (assessed by nurse)  Coordination:  normal finger-to-nose and heel-to-shin bilaterally without dysmetria, rapid alternating movements symmetric  Station/Gait:  unable to test due to telestroke      Dysphagia Screen  Per Nursing    Stroke Scales    NIHSS  Interval     Interval Comments     1a. Level of Consciousness 0-->Alert, keenly responsive   1b. LOC Questions 0-->Answers both questions correctly   1c. LOC Commands 0-->Performs both tasks correctly   2.   Best Gaze 0-->Normal   3.   Visual 0-->No visual loss   4.   Facial Palsy 0-->Normal symmetrical movements   5a. Motor Arm, Left 0-->No drift, limb holds 90 (or 45) degrees for full 10 secs   5b. Motor Arm, Right 0-->No drift, limb holds 90 (or 45) degrees for full 10 secs   6a. Motor Leg, Left 0-->No drift, leg holds 30 degree position for full 5 secs   6b. Motor Leg, right 0-->No drift, leg  holds 30 degree position for full 5 secs   7.   Limb Ataxia 0-->Absent   8.   Sensory 0-->Normal, no sensory loss   9.   Best Language 0-->No aphasia, normal   10. Dysarthria 0-->Normal   11. Extinction and Inattention  0-->No abnormality   Total 0 (06/09/20 1150)       Imaging  I personally reviewed all imaging; relevant findings per HPI.    Labs Data   CBC  Recent Labs   Lab 06/08/20  1828   WBC 6.6   RBC 4.46   HGB 13.5   HCT 41.3        Basic Metabolic Panel   Recent Labs   Lab 06/08/20  1828      POTASSIUM 4.0   CHLORIDE 103   CO2 28   BUN 19   CR 0.92   *   MAO 8.6     Liver Panel  Recent Labs   Lab Test 06/08/20 1828 06/10/19  2218   PROTTOTAL 7.2 7.3   ALBUMIN 3.7 3.5   BILITOTAL 0.5 0.4   ALKPHOS 78 123   AST 21 17   ALT 23 30     INRNo lab results found.   Lipid Profile  Recent Labs   Lab Test 06/08/20 1828 03/20/20  1112   CHOL  --  221*   HDL  --  43   * Cannot estimate LDL when triglyceride exceeds 400 mg/dL   TRIG  --  416*     A1C  Recent Labs   Lab Test 03/20/20  1112   A1C 5.6     Troponin I  Recent Labs   Lab 06/08/20  1828   TROPI <0.015          Stroke Code / Stroke Consult Data Data Telestroke Service Details  (for non-emergent stroke consult with tele)  Video start time 06/09/20   1146   Video end time 06/09/20   1205   Type of service telemedicine diagnostic assessment of acute neurological changes   Reason telemedicine is appropriate patient requires assessment with a specialist for diagnosis and treatment of neurological symptoms   Mode of transmission secure interactive audio and video communication per [employer]   Originating site (patient location) [facility]    Distant site (provider location) Provider Home Office     Stroke Education  -PLC stroke education consult    PHQ-2 Depression Screening  Over the last 2 weeks, how many days have you noted: Never   (0 points) Several  (1 point) More than half  (2 points) Nearly all  (3 points)   Little  interest/pleasure in doing things?    1     Feeling down, depressed, or hopeless? 0        PHQ-2 depression score: 1, consistent with a negative screen for depression.     I have personally spent a total of 50 minutes providing care and consulting with this patient's medical providers today, with more than 50% of this time spent in consultation, coordination of care, and discussion with the patient and/or family regarding diagnostic results, prognosis, symptom management, risks and benefits of management options, and development of plan of care.

## 2020-06-09 NOTE — PROGRESS NOTES
SPIRITUAL HEALTH SERVICES Progress Note  [facility] Med. Surg. 5    Saw pt Edgardojolie Mobleyamadeo per his request for  support.  Provided reflective conversation to facilitate storytelling and the processing of thoughts and feelings.      Illness Narrative - Elvis reported that yesterday he had a stroke, narrated that the events that led to his admission, and explained that he had a stroke in March.      Distress - He shared that he recently placed his wife in a LTC facility.  Elvis narrated that she has a history of a stroke in 2014 and chronic falling subsequently.        Coping -   o Elvis named a [place] of friends as being core to his support network.  One friend helps with meals and shopping. Elvis has three children, two daughters and one son.  All of them live in the area, but his son and one of his daughters are disabled because of back problems.   o Elvis's bess plays an important role in his self-care.  He is affiliated with [facility].  Elvis welcomed prayer.      Meaning-Making - Elvis reflected briefly that he has questioned God's presence lately in light of his distresses and the current unrest in the country.  Offered reflective listening and validation of feelings.      Plan - Informed him how he can request further  support.  This author and other chaplains remain available per pt's request.    Murali Salinas M.Div., [facility]  Staff   Pager [phone]

## 2020-06-09 NOTE — UTILIZATION REVIEW
"  Admission Status; Secondary Review Determination         Under the authority of the Utilization Management Committee, the utilization review process indicated a secondary review on the above patient.  The review outcome is based on review of the medical records, discussions with staff, and applying clinical experience noted on the date of the review.          (x) Observation Status Appropriate - This patient does not meet hospital inpatient criteria and is placed in observation status. If this patient's primary payer is Medicare and was admitted as an inpatient, Condition Code 44 should be used and patient status changed to \"observation\".     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 The severity of illness, intensity of service provided, expected LOS and risk for adverse outcome make the care appropriate for further observation; however, doesn't meet criteria for hospital inpatient admission. Dr. Zelaya  notified of this determination.    This document was produced using voice recognition software.      The information on this document is developed by the utilization review team in order for the business office to ensure compliance.  This only denotes the appropriateness of proper admission status and does not reflect the quality of care rendered.         The definitions of Inpatient Status and Observation Status used in making the determination above are those provided in the CMS Coverage Manual, Chapter 1 and Chapter 6, section 70.4.      Sincerely,     RAISSA CLEMENTE MD    System Medical Director  Utilization Management  [facility].      "

## 2020-06-09 NOTE — PLAN OF CARE
Pt up SBA, VSS -140s. Neuos intact, no deficits. C/o mild HA, gave tyl. Denies visual changes or dizziness. Had tele stoke & pt learning. Willie reg diet, denies nausea. Had echo, see results.

## 2020-06-09 NOTE — PLAN OF CARE
Arrived from ER ~0100. Denies pain or falls. Saline locked. HTN. MD aware and no intervention until >220 systolic.   Unable to sleep. Voiding frequently. SBA but weak. Using call light appropriately.   Consults today: PT, OT, Speech, Neuro

## 2020-06-10 ENCOUNTER — APPOINTMENT (OUTPATIENT)
Dept: OCCUPATIONAL THERAPY | Facility: CLINIC | Age: 85
End: 2020-06-10
Payer: COMMERCIAL

## 2020-06-10 ENCOUNTER — APPOINTMENT (OUTPATIENT)
Dept: CARDIOLOGY | Facility: CLINIC | Age: 85
End: 2020-06-10
Attending: PHYSICIAN ASSISTANT
Payer: COMMERCIAL

## 2020-06-10 VITALS
TEMPERATURE: 99.1 F | SYSTOLIC BLOOD PRESSURE: 135 MMHG | HEIGHT: 66 IN | RESPIRATION RATE: 18 BRPM | OXYGEN SATURATION: 93 % | BODY MASS INDEX: 24.91 KG/M2 | HEART RATE: 60 BPM | DIASTOLIC BLOOD PRESSURE: 65 MMHG | WEIGHT: 155 LBS

## 2020-06-10 LAB
ANION GAP SERPL CALCULATED.3IONS-SCNC: <1 MMOL/L (ref 3–14)
BUN SERPL-MCNC: 14 MG/DL (ref 7–30)
CALCIUM SERPL-MCNC: 8.2 MG/DL (ref 8.5–10.1)
CHLORIDE SERPL-SCNC: 107 MMOL/L (ref 94–109)
CO2 SERPL-SCNC: 32 MMOL/L (ref 20–32)
CREAT SERPL-MCNC: 0.98 MG/DL (ref 0.66–1.25)
ERYTHROCYTE [DISTWIDTH] IN BLOOD BY AUTOMATED COUNT: 12.6 % (ref 10–15)
GFR SERPL CREATININE-BSD FRML MDRD: 70 ML/MIN/{1.73_M2}
GLUCOSE BLDC GLUCOMTR-MCNC: 101 MG/DL (ref 70–99)
GLUCOSE SERPL-MCNC: 95 MG/DL (ref 70–99)
HCT VFR BLD AUTO: 35.8 % (ref 40–53)
HGB BLD-MCNC: 11.5 G/DL (ref 13.3–17.7)
MCH RBC QN AUTO: 30.2 PG (ref 26.5–33)
MCHC RBC AUTO-ENTMCNC: 32.1 G/DL (ref 31.5–36.5)
MCV RBC AUTO: 94 FL (ref 78–100)
PLATELET # BLD AUTO: 180 10E9/L (ref 150–450)
POTASSIUM SERPL-SCNC: 3.9 MMOL/L (ref 3.4–5.3)
RBC # BLD AUTO: 3.81 10E12/L (ref 4.4–5.9)
SODIUM SERPL-SCNC: 139 MMOL/L (ref 133–144)
WBC # BLD AUTO: 5.5 10E9/L (ref 4–11)

## 2020-06-10 PROCEDURE — 0296T ZIO PATCH HOLTER ADULT PEDIATRIC GREATER THAN 48 HRS: CPT

## 2020-06-10 PROCEDURE — 97535 SELF CARE MNGMENT TRAINING: CPT | Mod: GO

## 2020-06-10 PROCEDURE — 00000146 ZZHCL STATISTIC GLUCOSE BY METER IP

## 2020-06-10 PROCEDURE — 25000132 ZZH RX MED GY IP 250 OP 250 PS 637: Performed by: PHYSICIAN ASSISTANT

## 2020-06-10 PROCEDURE — 85027 COMPLETE CBC AUTOMATED: CPT | Performed by: INTERNAL MEDICINE

## 2020-06-10 PROCEDURE — 25000132 ZZH RX MED GY IP 250 OP 250 PS 637: Performed by: INTERNAL MEDICINE

## 2020-06-10 PROCEDURE — 0298T ZIO PATCH HOLTER ADULT PEDIATRIC GREATER THAN 48 HRS: CPT | Performed by: INTERNAL MEDICINE

## 2020-06-10 PROCEDURE — G0378 HOSPITAL OBSERVATION PER HR: HCPCS

## 2020-06-10 PROCEDURE — 80048 BASIC METABOLIC PNL TOTAL CA: CPT | Performed by: INTERNAL MEDICINE

## 2020-06-10 PROCEDURE — 36415 COLL VENOUS BLD VENIPUNCTURE: CPT | Performed by: INTERNAL MEDICINE

## 2020-06-10 PROCEDURE — 99217 ZZC OBSERVATION CARE DISCHARGE: CPT | Performed by: PHYSICIAN ASSISTANT

## 2020-06-10 RX ORDER — CLOPIDOGREL BISULFATE 75 MG/1
75 TABLET ORAL DAILY
Qty: 19 TABLET | Refills: 0 | Status: SHIPPED | OUTPATIENT
Start: 2020-06-10 | End: 2020-06-10

## 2020-06-10 RX ORDER — ROSUVASTATIN CALCIUM 10 MG/1
10 TABLET, COATED ORAL DAILY
Qty: 30 TABLET | Refills: 0 | Status: ON HOLD | OUTPATIENT
Start: 2020-06-10 | End: 2021-01-27

## 2020-06-10 RX ORDER — ASPIRIN 81 MG/1
81 TABLET, CHEWABLE ORAL DAILY
Refills: 0 | Status: ON HOLD | COMMUNITY
Start: 2020-06-11 | End: 2021-01-27

## 2020-06-10 RX ORDER — AMLODIPINE BESYLATE 10 MG/1
10 TABLET ORAL AT BEDTIME
Status: DISCONTINUED | OUTPATIENT
Start: 2020-06-10 | End: 2020-06-10 | Stop reason: HOSPADM

## 2020-06-10 RX ORDER — LISINOPRIL 40 MG/1
40 TABLET ORAL DAILY
Status: DISCONTINUED | OUTPATIENT
Start: 2020-06-10 | End: 2020-06-10 | Stop reason: HOSPADM

## 2020-06-10 RX ORDER — CLOPIDOGREL BISULFATE 75 MG/1
75 TABLET ORAL DAILY
Qty: 20 TABLET | Refills: 0 | Status: ON HOLD | COMMUNITY
Start: 2020-06-10 | End: 2021-01-27

## 2020-06-10 RX ADMIN — OMEPRAZOLE 20 MG: 20 CAPSULE, DELAYED RELEASE ORAL at 08:03

## 2020-06-10 RX ADMIN — ASPIRIN 81 MG 81 MG: 81 TABLET ORAL at 08:03

## 2020-06-10 RX ADMIN — CLOPIDOGREL BISULFATE 75 MG: 75 TABLET ORAL at 08:03

## 2020-06-10 RX ADMIN — LISINOPRIL 40 MG: 40 TABLET ORAL at 09:28

## 2020-06-10 NOTE — DISCHARGE SUMMARY
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   Asymptomatic sinus bradycardia     Other medical issues:  Past Medical History:   Diagnosis Date     BPH (benign prostatic hyperplasia)      BPPV (benign paroxysmal positional vertigo)      CAD (coronary artery disease)      CVA (cerebral vascular accident) (H)      Diverticulitis      HLD (hyperlipidemia)      Hypertension      Insomnia      Macular degeneration      Reflux esophagitis      History of Present Illness    Edgardo Rice is a 85 year old male with a past medical history of BPPV, reflux esophagitis, hyperlipidemia, hypertension, BPH, coronary artery disease, and CVA who presents to [facility] ED on 6/8/2020 via EMS with dizziness. Please see the admission history and physical for full details.    Hospital Course   Edgardo Rice was admitted on 6/8/2020.  The following problems were addressed during his hospitalization:    #Acute CVA of right occipital and right parietal lobes, ESUS: presented with acute onset of dizziness around 7:30 AM the morning of 6/8 that progressed to spinning sensation made worse with movement with associated nausea and vomiting. MRI of brain showed small infarct in the right occipital and right parietal lobes. CTA showed short segment moderate narrowing distal right P1/proximal P2 (similar to previous). Patient was previously diagnosed with a CVA in 03/20, thus was already on ASA and Plavix for this. Neurology consulted and assessed patient during stay and felt it was undetermined the source for the patient's stroke.   - Plavix 75 mg + ASA 81 mg daily for 20 additional days, complete 90 days of treatment from his initial stroke in 03/2020  - Trial Rosuvastatin 10 mg  "daily, discuss if tolerating with PCP, previously not tolerated statin therapy in the past   - Patient declines wearing a 30 day event monitor again, agreeable to wear 14 days Zio patch to reassess for A-fib   - Assessed by OT during stay with recommendation for outpatient therapy  - Neurology follow up in 4-6 weeks   - Follow up with PCP within 7 days     #Asymptomatic sinus bradycardia: bradycardic in the 50s with holding beta blocker during admission, asymptomatic.   - Continue to hold Metoprolol at discharge, reassess if able to resume in outpatient setting     #HTN: initially hypertensive up to 170/70s with improvement to 135/65 prior to discharge with resumption of Amlodipine and Lisinopril. Would monitor as outpatient to make sure BP stays at goal of <130/85.   - Continue PTA Amlodipine and Lisinopril  - Hold Metoprolol due to bradycardia     #Asymptomatic COVID: negative on 6/8    Pending Results   None    Code Status   DNI       Primary Care Physician   [facility]    Exam:    /65 (BP Location: Right arm)   Pulse 60   Temp 99.1  F (37.3  C) (Oral)   Resp 18   Ht 1.676 m (5' 6\")   Wt 70.3 kg (155 lb)   SpO2 93%   BMI 25.02 kg/m    Constitutional: Awake, alert, cooperative, no apparent distress.  HEENT: AT. NC. Conjunctiva non-injected. Sclera anicteric. Pupils examined and normal. MMM.   Respiratory: No use of accessory respiratory muscles. Clear to auscultation bilaterally, no crackles or wheezing.  Cardiovascular: Regular rate and rhythm, normal S1 and S2, and no murmur noted. Left-sided carotid murmur.  GI: Soft, non-distended, non-tender, normal bowel sounds.  Skin: No rashes, no cyanosis, no edema.  Neurologic: Cranial nerves 2-12 intact, normal strength and sensation. Normal finger to nose and rapid alternating movements.    Discharge Disposition   Discharged to home    Consultations This Hospital Stay   NEUROLOGY IP CONSULT  PHYSICAL THERAPY ADULT IP CONSULT  OCCUPATIONAL " THERAPY ADULT IP CONSULT  SPEECH LANGUAGE PATH ADULT IP CONSULT  SWALLOW EVAL SPEECH PATH AT BEDSIDE IP CONSULT  PATIENT [facility] IP CONSULT    Time Spent on this Encounter   I, Jennifer Callejas PA-C, personally saw the patient today and spent greater than 30 minutes discharging this patient.    Discharge Orders      OCCUPATIONAL THERAPY REFERRAL      NEUROSURGERY REFERRAL      Reason for your hospital stay    You were admitted for concern of dizziness and headache. Your workup included a MRI of your brain which showed small infarcts in the right occipital and right parietal lobs (concerning for new strokes). Your heart was monitored overnight and showed no irregular rhythm but showed your heart rate to be slower even with holding your beta blocker (Metoprolol). You had an echocardiogram done that showed normal heart function but with an enlarged left atrium.     The goal after a CVA is to prevent a future stroke by controlling blood pressure, treating high cholesterol and starting appropriate anticoagulation (blood thinner). Your LDL was checked (elevated at 144, recommended to be 40-70) and we recommend trial of low dose statin. Your blood pressure was slightly elevated but likely related to holding your home blood pressure medications and we recommend continue your Lisinopril and Amlodipine. You should stop your Metoprolol due to low hear rates and follow up with your primary care provider if your blood pressure is elevated with stopping this. We have continued you on Plavix 75 mg daily with Aspirin 81 mg daily as a blood thinner. To assess for atrial fibrillation (irregular heart rhythm that can cause a stroke) you should wear a heart monitor again, you were agreeable to wearing a Zio patch for 14 days.     We recommend you follow up with stroke neurology in 4-6 weeks, referral was sent and you should be contacted at discharge to set this appointment up.     Follow-up and recommended labs and tests      [facility] for hospital follow- up.  No follow up labs or test are needed.     Activity    Your activity upon discharge: activity as tolerated     When to contact your care team    Return to the emergency room for evaluation if you have recurrence of dizziness, headache or new symptoms of slurred speech, facial droop, or unilateral weakness.     DNI     Diet    Follow this diet upon discharge: Orders Placed This Encounter      Advance Diet as Tolerated: Regular Diet Adult     Discharge Medications   Current Discharge Medication List      START taking these medications    Details   aspirin (ASA) 81 MG chewable tablet Take 1 tablet (81 mg) by mouth daily  Qty:  , Refills: 0    Associated Diagnoses: Cerebrovascular accident (CVA) due to other mechanism (H)      rosuvastatin (CRESTOR) 10 MG tablet Take 1 tablet (10 mg) by mouth daily  Qty: 30 tablet, Refills: 0    Associated Diagnoses: Cerebrovascular accident (CVA) due to other mechanism (H)         CONTINUE these medications which have CHANGED    Details   clopidogrel (PLAVIX) 75 MG tablet Take 1 tablet (75 mg) by mouth daily  Qty: 19 tablet, Refills: 0    Associated Diagnoses: Cerebrovascular accident (CVA) due to other mechanism (H)         CONTINUE these medications which have NOT CHANGED    Details   acetaminophen (TYLENOL) 500 MG tablet Take 1,000 mg by mouth At Bedtime      amLODIPine (NORVASC) 10 MG tablet Take 10 mg by mouth At Bedtime      gabapentin (NEURONTIN) 300 MG capsule Take 600 mg by mouth At Bedtime      lisinopril (ZESTRIL) 20 MG tablet Take 40 mg by mouth daily      multivitamin w/minerals (THERA-VIT-M) tablet Take 1 tablet by mouth daily      omeprazole (PRILOSEC) 20 MG DR capsule Take 20 mg by mouth daily         STOP taking these medications       aspirin (ASA) 325 MG tablet Comments:   Reason for Stopping:         metoprolol tartrate (LOPRESSOR) 25 MG tablet Comments:   Reason  for Stopping:             Allergies   Allergies   Allergen Reactions     Atorvastatin      Cholestyramine      Diphenhydramine      Droperidol      Morphine      Norco [Hydrocodone-Acetaminophen]      Sulfa Drugs      Tramadol      Tylenol With Codeine [Acetaminophen-Codeine]      Data   Results for orders placed or performed during the hospital encounter of 06/08/20   MR Brain w/o & w Contrast     Status: None    Narrative    EXAM: MR BRAIN W/O and W CONTRAST  LOCATION: [facility]  DATE/TIME: 6/8/2020 7:49 PM    INDICATION: Dizziness nausea and vomiting.  COMPARISON: None.  CONTRAST: 7.5 mL Gadavist  TECHNIQUE: Routine multiplanar multisequence head MRI without and with intravenous contrast.    FINDINGS:  INTRACRANIAL CONTENTS: Diffusion-weighted images reveal a small area of restricted diffusion measuring about 3 mm in diameter in the right occipital lobe. There is also a small, 3 mm area of restricted diffusion in the right parietal lobe. A second area   of restricted diffusion measuring about 5 mm in diameter is also seen in the right parietal lobe. These areas of restricted diffusion are consistent with recent infarcts. No mass, acute hemorrhage, or extra-axial fluid collections. There is diffuse   parenchymal volume loss. White matter changes are present in the cerebral hemispheres that are consistent with small vessel ischemic disease in this age patient. Normal position of the cerebellar tonsils. No pathologic contrast enhancement.    SELLA: No abnormality accounting for technique.    OSSEOUS STRUCTURES/SOFT TISSUES: Normal marrow signal. The major intracranial vascular flow voids are maintained.     ORBITS: No abnormality accounting for technique.     SINUSES/MASTOIDS: No paranasal sinus mucosal disease. No middle ear or mastoid effusion.       Impression    IMPRESSION:  1.  Small areas of restricted diffusion are seen in the right occipital and right parietal lobes. These are consistent with  recent infarcts.  2.  There is diffuse parenchymal volume loss. White matter changes are present in the cerebral hemispheres that are consistent with small vessel ischemic disease in this age patient.       XR Chest Port 1 View     Status: None    Narrative    EXAM: XR CHEST PORT 1 VW  LOCATION: [facility]  DATE/TIME: 6/8/2020 8:34 PM    INDICATION: Shortness of breath  COMPARISON: 10/14/2007      Impression    IMPRESSION: Low lung volumes. Heart size and pulmonary vascularity normal. Slight elevation right hemidiaphragm. Lungs otherwise are clear. Surgical clips left neck.   CTA Head Neck with Contrast     Status: None    Narrative    EXAM: CTA  HEAD NECK WITH CONTRAST  LOCATION: [facility]  DATE/TIME: 6/8/2020 10:14 PM    INDICATION: Small acute infarcts, dizziness  COMPARISON: MRI head 06/08/2020, MRA head 03/20/2020  CONTRAST: 70mL [id number]  TECHNIQUE: Head and neck CT angiogram with IV contrast. Axial helical CT images of the head and neck vessels obtained during the arterial phase of intravenous contrast administration. Axial 2D reconstructed images and multiplanar 3D MIP reconstructed   images of the head and neck vessels were performed by the technologist. Dose reduction techniques were used. All stenosis measurements made according to NASCET criteria unless otherwise specified.    FINDINGS:   HEAD CTA:  ANTERIOR CIRCULATION: Mild atherosclerotic changes, similar to prior. Standard [place] anatomy.    POSTERIOR CIRCULATION: Short segment moderate narrowing at the distal right P1/proximal right P2; similar to prior. Balanced vertebral arteries supply a normal basilar artery.     DURAL VENOUS SINUSES: Expected enhancement of the major dural venous sinuses.    NECK CTA:  RIGHT CAROTID: Less than 50% narrowing proximal right ICA    LEFT CAROTID: Postoperative changes endarterectomy. Mild, focal, less than 50% narrowing at the distal left common carotid artery without  hemodynamically significant narrowing in the proximal left ICA.    VERTEBRAL ARTERIES: Mild narrowing at the origin of right vertebral artery. Balanced vertebral arteries.    AORTIC ARCH: Classic aortic arch anatomy with no significant stenosis at the origin of the great vessels.    NONVASCULAR STRUCTURES: Unremarkable.      Impression    IMPRESSION:   HEAD CTA:   1.  Short segment moderate narrowing distal right P1/proximal P2, similar to prior.  2.  Mild atherosclerotic changes throughout anterior circulation, similar to prior.    NECK CTA:  1.  Post left endarterectomy.  2.  Mild narrowings, as described; without evidence of hemodynamically significant narrowing.   CBC with platelets differential     Status: Abnormal   Result Value Ref Range    WBC 6.6 4.0 - 11.0 10e9/L    RBC Count 4.46 4.4 - 5.9 10e12/L    Hemoglobin 13.5 13.3 - 17.7 g/dL    Hematocrit 41.3 40.0 - 53.0 %    MCV 93 78 - 100 fl    MCH 30.3 26.5 - 33.0 pg    MCHC 32.7 31.5 - 36.5 g/dL    RDW 12.5 10.0 - 15.0 %    Platelet Count 210 150 - 450 10e9/L    Diff Method Automated Method     % Neutrophils 82.0 %    % Lymphocytes 10.7 %    % Monocytes 5.5 %    % Eosinophils 0.3 %    % Basophils 0.9 %    % Immature Granulocytes 0.6 %    Nucleated RBCs 0 0 /100    Absolute Neutrophil 5.4 1.6 - 8.3 10e9/L    Absolute Lymphocytes 0.7 (L) 0.8 - 5.3 10e9/L    Absolute Monocytes 0.4 0.0 - 1.3 10e9/L    Absolute Eosinophils 0.0 0.0 - 0.7 10e9/L    Absolute Basophils 0.1 0.0 - 0.2 10e9/L    Abs Immature Granulocytes 0.0 0 - 0.4 10e9/L    Absolute Nucleated RBC 0.0    Comprehensive metabolic panel     Status: Abnormal   Result Value Ref Range    Sodium 134 133 - 144 mmol/L    Potassium 4.0 3.4 - 5.3 mmol/L    Chloride 103 94 - 109 mmol/L    Carbon Dioxide 28 20 - 32 mmol/L    Anion Gap 3 3 - 14 mmol/L    Glucose 190 (H) 70 - 99 mg/dL    Urea Nitrogen 19 7 - 30 mg/dL    Creatinine 0.92 0.66 - 1.25 mg/dL    GFR Estimate 76 >60 mL/min/[1.73_m2]    GFR Estimate If Black  88 >60 mL/min/[1.73_m2]    Calcium 8.6 8.5 - 10.1 mg/dL    Bilirubin Total 0.5 0.2 - 1.3 mg/dL    Albumin 3.7 3.4 - 5.0 g/dL    Protein Total 7.2 6.8 - 8.8 g/dL    Alkaline Phosphatase 78 40 - 150 U/L    ALT 23 0 - 70 U/L    AST 21 0 - 45 U/L   Troponin I     Status: None   Result Value Ref Range    Troponin I ES <0.015 0.000 - 0.045 ug/L   Asymptomatic COVID-19 Virus (Coronavirus) by PCR     Status: None    Specimen: Nasopharyngeal   Result Value Ref Range    COVID-19 Virus PCR to [facility] - Source Nasopharyngeal     COVID-19 Virus PCR to [facility] - Result Not Detected    Magnesium     Status: None   Result Value Ref Range    Magnesium 2.0 1.6 - 2.3 mg/dL   Phosphorus     Status: None   Result Value Ref Range    Phosphorus 3.4 2.5 - 4.5 mg/dL   Glucose by meter     Status: Abnormal   Result Value Ref Range    Glucose 110 (H) 70 - 99 mg/dL   Glucose by meter     Status: Abnormal   Result Value Ref Range    Glucose 111 (H) 70 - 99 mg/dL   LDL cholesterol direct     Status: Abnormal   Result Value Ref Range    LDL Cholesterol Direct 144 (H) <100 mg/dL   Platelet Function P2Y12     Status: None   Result Value Ref Range    Platelet Reactivity P2Y12 162 PRU   Platelet Function ASA     Status: None   Result Value Ref Range    Platelet Function  ARU   Glucose by meter     Status: Abnormal   Result Value Ref Range    Glucose 110 (H) 70 - 99 mg/dL   Basic metabolic panel     Status: Abnormal   Result Value Ref Range    Sodium 139 133 - 144 mmol/L    Potassium 3.9 3.4 - 5.3 mmol/L    Chloride 107 94 - 109 mmol/L    Carbon Dioxide 32 20 - 32 mmol/L    Anion Gap <1 (L) 3 - 14 mmol/L    Glucose 95 70 - 99 mg/dL    Urea Nitrogen 14 7 - 30 mg/dL    Creatinine 0.98 0.66 - 1.25 mg/dL    GFR Estimate 70 >60 mL/min/[1.73_m2]    GFR Estimate If Black 81 >60 mL/min/[1.73_m2]    Calcium 8.2 (L) 8.5 - 10.1 mg/dL   CBC with platelets     Status: Abnormal   Result Value Ref Range    WBC 5.5 4.0 - 11.0 10e9/L    RBC Count 3.81 (L) 4.4 -  "5.9 10e12/L    Hemoglobin 11.5 (L) 13.3 - 17.7 g/dL    Hematocrit 35.8 (L) 40.0 - 53.0 %    MCV 94 78 - 100 fl    MCH 30.2 26.5 - 33.0 pg    MCHC 32.1 31.5 - 36.5 g/dL    RDW 12.6 10.0 - 15.0 %    Platelet Count 180 150 - 450 10e9/L   Glucose by meter     Status: Abnormal   Result Value Ref Range    Glucose 101 (H) 70 - 99 mg/dL   EKG 12 lead     Status: None   Result Value Ref Range    Interpretation ECG Click View Image link to view waveform and result    Neurology IP Consult: Stroke (potential or actual); Patient to be seen: Routine - within 24 hours; CVA; Consultant may enter orders: Yes; Requesting provider? Hospitalist (if different from attending physician)     Status: None ()    Itzel Michel PA-C     6/9/2020  7:05 PM    [facility]    Stroke Consult Note    Reason for Consult:  \"CVA\"    Chief Complaint: dizziness, nausea, recent stroke       HPI  Edgardo Rice is an 85 year old man who presented to the   hospital yesterday after becoming acutely dizzy in the morning.   He also had vomiting and some headache. He reports that he feels   much better today. He also had stroke back in 3/2020 and was   started on DAPT for 90 days for possible symptomatic intracranial   stenosis. He continues DAPT now without any side effects. He   reports he was unable to complete full 30 day monitoring to   screen for afib but the time he did complete (About 3 weeks)   showed none.        Stroke Evaluation summarized:  MRI/Head CT: MRI brain shows small infarcts in the right   occipital and right parietal lobes  Intracranial Vascular Imaging: CTA head shows Short segment   moderate narrowing distal right P1/proximal P2, similar to prior.  Cervical Carotid and Vertebral Artery Vascular Imaging: CTA neck   shows pt is post left CEA mild other narrowings  Echocardiogram: EF 60-65%, moderate diastolic dysfunction,   pulmonary artery pressure significantly higher than previous   echo. LA is severely " "dilated  EKG/Telemetry: sinus bradycardia  LDL: 144  A1c: 5.6  Troponin: <0.015   Other testing: Not Applicable           Impression  Ischemic Stroke due to embolic stroke of undetermined source   (ESUS)     Recommendations  - Continue aspirin 81mg and Plavix 75mg together for 20   additional days; then aspirin 325mg alone. labs for both show   patient response appropriately to those drugs  - Start a statin for goal LDL 40-70. Important not only for   recurrent stroke prevention but especially so with the amount of   aortic arch plaque the patient has which can be a contributor in   ESUS cases. Looks like allergy to atorvastatin is listed- will   address with patient tomorrow  - Permissive HTN for 24 hours, then ok to treat slowly with long   term goal of normotension goal <135/80 if tolerated  - [facility] stroke education consult  - PT/OT/ST  - Encourage patient to redo 30 day cardionet monitoring upon   discharge. LAst time he reports he was unable to complete the   full 30 days. He has a severely dilated LA and atrial   fibrillation is certainly a concern.    Patient Follow-up    - in the next 1 week(s) with PCP   - Follow-up in Stroke Clinic (located at [facility]   [facility], [phone]) with Dr. Callaway or Stroke Specialist   ANGEL: Christy Escamilla PA-C, Anne Sanz, GARRY, Calista Calderon PA-C in 4-6   weeks. Referral placed in discharge orders section (note: the   order states \"neurosurgery\")      Thank you for this consult. No further stroke evaluation is   recommended, so we will sign off. Please contact us with any   additional questions. We will discuss these final recs with the   patient tomorrow however as most of those things were not   completed when seeing patient earlier in the day.    Itzel Escamilla PA-C  Neurology  06/09/2020 7:01 PM  To page me or covering stroke neurology team member, click here:   [facility]  Choose \"On Call\" tab at top, then search dropdown box for   \"Neurology Adult\" & press " Enter, look for Neuro ICU/Stroke    _____________________________________________________    Past Medical History   Past Medical History:   Diagnosis Date     BPH (benign prostatic hyperplasia)      BPPV (benign paroxysmal positional vertigo)      CAD (coronary artery disease)      CVA (cerebral vascular accident) (H)      Diverticulitis      HLD (hyperlipidemia)      Hypertension      Insomnia      Macular degeneration      Reflux esophagitis      Past Surgical History   Past Surgical History:   Procedure Laterality Date     ARTHROSCOPY KNEE Right      CAROTID ENDARTERECTOMY       CHOLECYSTECTOMY       HEMORRHOIDECTOMY       HERNIA REPAIR       HERNIA REPAIR       KERATOPLASTY PENETRATING, VITRECTOMY ANTERIOR, EXTRACAPSULAR   CATARACT EXTRACTION, COMBINED Bilateral     W/ intraocular lens implant     Medications   Home Meds  Medication Sig   acetaminophen (TYLENOL) 500 MG tablet Take 1,000 mg by mouth At   Bedtime   amLODIPine (NORVASC) 10 MG tablet Take 10 mg by mouth At Bedtime   aspirin (ASA) 325 MG tablet Take 1 tablet (325 mg) by mouth daily     clopidogrel (PLAVIX) 75 MG tablet Take 1 tablet (75 mg) by mouth   daily   gabapentin (NEURONTIN) 300 MG capsule Take 600 mg by mouth At   Bedtime   lisinopril (ZESTRIL) 20 MG tablet Take 40 mg by mouth daily   metoprolol tartrate (LOPRESSOR) 25 MG tablet Take 25 mg by mouth   2 times daily   multivitamin w/minerals (THERA-VIT-M) tablet Take 1 tablet by   mouth daily   omeprazole (PRILOSEC) 20 MG DR capsule Take 20 mg by mouth daily       Scheduled Meds    aspirin  81 mg Oral Daily     clopidogrel  75 mg Oral Daily     gabapentin  600 mg Oral At Bedtime     insulin aspart  1-7 Units Subcutaneous BID     insulin aspart  1-5 Units Subcutaneous At Bedtime     omeprazole  20 mg Oral Daily     sodium chloride (PF)  3 mL Intracatheter Q8H       Infusion Meds      PRN Meds  acetaminophen, bisacodyl, glucose **OR** dextrose **OR**   glucagon, hydrALAZINE, labetalol, lidocaine  4%, lidocaine   (buffered or not buffered), melatonin, naloxone, ondansetron   **OR** ondansetron, polyethylene glycol, prochlorperazine **OR**   prochlorperazine **OR** prochlorperazine, senna-docusate **OR**   senna-docusate, sodium chloride (PF)    Allergies   Allergies   Allergen Reactions     Atorvastatin      Cholestyramine      Diphenhydramine      Droperidol      Morphine      Norco [Hydrocodone-Acetaminophen]      Sulfa Drugs      Tramadol      Tylenol With Codeine [Acetaminophen-Codeine]      Family History   No family history on file.  Social History   Social History     Tobacco Use     Smoking status: Not on file   Substance Use Topics     Alcohol use: Not on file     Drug use: Not on file       Review of Systems   The 10 point Review of Systems is negative other than noted in   the HPI or here.        PHYSICAL EXAMINATION   Temp:  [95.5  F (35.3  C)-99.1  F (37.3  C)] 99.1  F (37.3  C)  Pulse:  [49-60] 60  Heart Rate:  [50-61] 57  Resp:  [8-23] 18  BP: (139-192)/(46-80) 178/73  SpO2:  [91 %-100 %] 94 %    General:  patient lying in bed without any acute distress    HEENT:  normocephalic/atraumatic  Pulmonary:  no respiratory distress    Neurologic  Mental Status:  alert, oriented x 3, follows commands, speech   clear and fluent, naming and repetition normal  Cranial Nerves:  visual fields intact (tested by nurse), EOMI   with normal smooth pursuit, facial sensation intact and symmetric   (tested by nurse), facial movements symmetric, hearing not   formally tested but intact to conversation, no dysarthria,   shoulder shrug equal bilaterally, tongue protrusion midline  Motor:  no abnormal movements, able to move all limbs antigravity   spontaneously with no signs of hemiparesis observed, no pronator   drift  Reflexes:  unable to test (telestroke)  Sensory:  light touch sensation intact and symmetric throughout   upper and lower extremities (assessed by nurse), no extinction on   double simultaneous  stimulation (assessed by nurse)  Coordination:  normal finger-to-nose and heel-to-shin bilaterally   without dysmetria, rapid alternating movements symmetric  Station/Gait:  unable to test due to telestroke      Dysphagia Screen  Per Nursing    Stroke Scales    NIHSS  Interval     Interval Comments     1a. Level of Consciousness 0-->Alert, keenly responsive   1b. LOC Questions 0-->Answers both questions correctly   1c. LOC Commands 0-->Performs both tasks correctly   2.   Best Gaze 0-->Normal   3.   Visual 0-->No visual loss   4.   Facial Palsy 0-->Normal symmetrical movements   5a. Motor Arm, Left 0-->No drift, limb holds 90 (or 45) degrees   for full 10 secs   5b. Motor Arm, Right 0-->No drift, limb holds 90 (or 45) degrees   for full 10 secs   6a. Motor Leg, Left 0-->No drift, leg holds 30 degree position   for full 5 secs   6b. Motor Leg, right 0-->No drift, leg holds 30 degree position   for full 5 secs   7.   Limb Ataxia 0-->Absent   8.   Sensory 0-->Normal, no sensory loss   9.   Best Language 0-->No aphasia, normal   10. Dysarthria 0-->Normal   11. Extinction and Inattention  0-->No abnormality   Total 0 (06/09/20 1150)       Imaging  I personally reviewed all imaging; relevant findings per HPI.    Labs Data   CBC  Recent Labs   Lab 06/08/20  1828   WBC 6.6   RBC 4.46   HGB 13.5   HCT 41.3        Basic Metabolic Panel   Recent Labs   Lab 06/08/20  1828      POTASSIUM 4.0   CHLORIDE 103   CO2 28   BUN 19   CR 0.92   *   MAO 8.6     Liver Panel  Recent Labs   Lab Test 06/08/20 1828 06/10/19  2218   PROTTOTAL 7.2 7.3   ALBUMIN 3.7 3.5   BILITOTAL 0.5 0.4   ALKPHOS 78 123   AST 21 17   ALT 23 30     INRNo lab results found.   Lipid Profile  Recent Labs   Lab Test 06/08/20 1828 03/20/20  1112   CHOL  --  221*   HDL  --  43   * Cannot estimate LDL when triglyceride exceeds 400 mg/dL   TRIG  --  416*     A1C  Recent Labs   Lab Test 03/20/20  1112   A1C 5.6     Troponin I  Recent Labs    Lab 06/08/20  1828   TROPI <0.015          Stroke Code / Stroke Consult Data Data Telestroke Service Details    (for non-emergent stroke consult with tele)  Video start time 06/09/20   1146   Video end time 06/09/20   1205   Type of service telemedicine diagnostic assessment of acute   neurological changes   Reason telemedicine is appropriate patient requires assessment   with a specialist for diagnosis and treatment of neurological   symptoms   Mode of transmission secure interactive audio and video   communication inocencia Villagran   Originating site (patient location) [facility]    Distant site (provider location) Provider Home Office     Stroke Education  -[facility] stroke education consult    PHQ-2 Depression Screening  Over the last 2 weeks, how many days have you noted: Never   (0 points) Several  (1 point) More than half  (2 points) Nearly all  (3 points)   Little interest/pleasure in doing things?    1     Feeling down, depressed, or hopeless? 0        PHQ-2 depression score: 1, consistent with a negative screen for   depression.     I have personally spent a total of 50 minutes providing care and   consulting with this patient's medical providers today, with more   than 50% of this time spent in consultation, coordination of   care, and discussion with the patient and/or family regarding   diagnostic results, prognosis, symptom management, risks and   benefits of management options, and development of plan of care.     [facility] IP Consult     Status: None ()    Mayte Iyer RN     6/9/2020  2:15 PM  Stroke Education Note    The following information has been reviewed with the patient:    1. Warning signs of stroke    2. Calling 911 if having warning signs of stroke    3. All modifiable risk factors: hypertension, CAD, atrial fib,   diabetes, hypercholesterolemia, smoking, substance abuse, diet,   physical inactivity, obesity, sleep apnea.    4. Patient's risk factors  for stroke which include: HTN, HLD,   CAD, previous stroke    5. Follow-up plan for after discharge    6. Discharge medications which include: Lisinopril    In addition, the [facility] Stroke Class Handout has been given to the   patient.    Learner's response to risk factors / lifestyle modification   education: NA - Precontemplative     Mayte Tam RN       Echocardiogram Complete     Status: None    Narrative    [id number]  [id number]  [id number]  [id number]^^WESLEY^RADHA           [facility]  Echocardiography Laboratory  [address] [place] [address]        Name: OBDULIA DIALLO  MRN: [id number]  : 1935  Study Date: 2020 02:17 PM  Age: 85 yrs  Gender: Male  Patient Location: [facility]  Reason For Study: CVA  Ordering Physician: WESLEY ROSS  Referring Physician: ERIC SANCHEZ  Performed By: Martin Rivas RDCS     BSA: 1.8 m2  Height: 66 in  Weight: 155 lb  HR: 62  BP: 146/51 mmHg  _____________________________________________________________________________  __        Procedure  Complete Portable Echo Adult.  _____________________________________________________________________________  __        Interpretation Summary     The left ventricle is normal in size. Proximal septal thickening is noted.  Left ventricular systolic function is normal. The visual ejection fraction is  estimated at 60-65%. Grade II or moderate diastolic dysfunction. Diastolic  Doppler findings (E/E' ratio and/or other parameters) suggest left ventricular  filling pressures are increased. No regional wall motion abnormalities noted.  The right ventricle is normal size. The right ventricular systolic function is  normal.  There is mild to moderate (1-2+) mitral regurgitation.  There is moderate (2+) tricuspid regurgitation. The right ventricular systolic  pressure is approximated at 55.1 mmHg plus the right atrial pressure. IVC  diameter >2.1 cm collapsing <50% with sniff suggests a high RA  pressure  estimated at 15 mmHg or greater. Right ventricular systolic pressure is  elevated, consistent with severe pulmonary hypertension.  No pericardial effusion.  In comparison to the previous report dated 03/2020, the estimated pulmonary  artery systolic pressures are significantly higher (previously reported as  normal).  _____________________________________________________________________________  __        Left Ventricle  The left ventricle is normal in size. Proximal septal thickening is noted.  Left ventricular systolic function is normal. The visual ejection fraction is  estimated at 60-65%. Grade II or moderate diastolic dysfunction. Diastolic  Doppler findings (E/E' ratio and/or other parameters) suggest left ventricular  filling pressures are increased. No regional wall motion abnormalities noted.     Right Ventricle  The right ventricle is normal size. The right ventricular systolic function is  normal.     Atria  The left atrium is severely dilated. Right atrial size is normal. There is no  color Doppler evidence of an atrial shunt.     Mitral Valve  There is mild to moderate (1-2+) mitral regurgitation.        Tricuspid Valve  There is moderate (2+) tricuspid regurgitation. The right ventricular systolic  pressure is approximated at 55.1 mmHg plus the right atrial pressure. IVC  diameter >2.1 cm collapsing <50% with sniff suggests a high RA pressure  estimated at 15 mmHg or greater. Right ventricular systolic pressure is  elevated, consistent with severe pulmonary hypertension.     Aortic Valve  There is mild trileaflet aortic sclerosis. There is mild (1+) aortic  regurgitation. No aortic stenosis is present.     Pulmonic Valve  There is mild (1+) pulmonic valvular regurgitation. There is no pulmonic  valvular stenosis.     Vessels  The aortic root is normal size. Normal size ascending aorta. Dilation of the  inferior vena cava is present with normal respiratory variation in diameter.      Pericardium  There is no pericardial effusion.        Rhythm  Sinus rhythm was noted.  _____________________________________________________________________________  __  MMode/2D Measurements & Calculations  IVSd: 1.2 cm     LVIDd: 4.6 cm  LVIDs: 2.0 cm  LVPWd: 1.00 cm  FS: 56.7 %  LV mass(C)d: 180.3 grams  LV mass(C)dI: 100.4 grams/m2  Ao root diam: 3.4 cm  LA dimension: 4.0 cm  asc Aorta Diam: 3.6 cm  LA/Ao: 1.2  LA Volume (BP): 90.0 ml  LA Volume Index (BP): 50.3 ml/m2  RWT: 0.44           Doppler Measurements & Calculations  MV E max raven: 96.0 cm/sec  MV A max raven: 60.9 cm/sec  MV E/A: 1.6  MV dec time: 0.20 sec  Ao V2 max: 190.0 cm/sec  Ao max PG: 15.0 mmHg  AI P1/2t: 402.5 msec  PA acc time: 0.09 sec  TR max raven: 371.0 cm/sec  TR max P.1 mmHg  E/E' av.2  Lateral E/e': 15.4  Medial E/e': 17.0              _____________________________________________________________________________  __        Report approved by: Dioni Hogue 2020 03:21 PM        Jennifer Callejas PA-C

## 2020-06-10 NOTE — PROGRESS NOTES
Brief telestroke progress note:     Patient reports no symptoms overnight.       Stroke Evaluation summarized:  MRI/Head CT: MRI brain shows small infarcts in the right occipital and right parietal lobes  Intracranial Vascular Imaging: CTA head shows Short segment moderate narrowing distal right P1/proximal P2, similar to prior.  Cervical Carotid and Vertebral Artery Vascular Imaging: CTA neck shows pt is post left CEA mild other narrowings  Echocardiogram: EF 60-65%, moderate diastolic dysfunction, pulmonary artery pressure significantly higher than previous echo. 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    - LDL (144) ; goal 40-70. Patient reports side effects associated with multiple statin medications. Per primary team, patient agreed with Crestor 10 mg. If he does not tolerate this, discussed with patient possible switch to Repatha (PCSk9 inhibitor). Advised to discuss with PCP.     - Goal BP <130/85 with tighter control associated with decreased overall CV risk, if tolerated    - 14 Day Zio Patch to continue monitoring for atrial fibrillation     Patient Follow-up:  - Patient to follow up in 6 weeks with [facility].       Telestroke Service Details  (for non-emergent stroke consult with tele)  Video start time 06/10/20   0960   Video end time 06/10/20   0912   Type of service telemedicine diagnostic assessment of acute neurological changes   Reason telemedicine is appropriate patient requires assessment with a specialist for diagnosis and treatment of neurological symptoms   Mode of transmission secure interactive audio and video communication per Sparkle   Originating site (patient location) [facility]    Distant site  (provider location) Provider Home Office   Telemedicine used today to minimize in-person interactions and PPE use due to COVID-19.        Calista Calderon PA-C on 6/10/2020 at 10:15 AM

## 2020-06-10 NOTE — PLAN OF CARE
PRIMARY DIAGNOSIS: SYNCOPE/TIA  OUTPATIENT/OBSERVATION GOALS TO BE MET BEFORE DISCHARGE:  1. Orthostatic performed: N/A    2. Diagnostic testing complete & at baseline neurologic testing: Yes    3. Cleared by consultants (if involved): Yes    4. Interpretation of cardiac rhythm per telemetry tech: SR    5. Tolerating adequate PO diet and medications: Yes    6. Return to near baseline physical activity or neurologic status: Yes    Discharge Planner Nurse   Safe discharge environment identified: Yes  Barriers to discharge: No       Entered by: Qian Campos 06/10/2020 8:09 AM     Please review provider order for any additional goals.  Nurse to notify provider when observation goals have been met and patient is ready for discharge.    Patient does not want to go home with a cardiac monitor.

## 2020-06-10 NOTE — PLAN OF CARE
Discharge Planner OT   Patient plan for discharge: Home  Current status: Pt completed transfers/mobility in room with no assistive device and SBA/supervision. Pt ambulated in hallway and  up/down 5 stairs with use of B rails with SBA/supervision. /74 post activity. Pt completed toilet transfer/toileting tasks and grooming/hygiene tasks in stand at sink with supervision. Pt completed LB dressing with set up for pants. Educated on OP OT for vision rehab, verbalized understanding. Pt has achieved all goals, no further questions/concerns.   Barriers to return to prior living situation: Mild vision deficit  Recommendations for discharge: Home w/ OP OT for vision rehab  Rationale for recommendations: Pt doing well, has achieved all OT goals. Recommend ongoing skilled OT while IP and in OP setting for more in-depth vision assessment.        Entered by: Mercedes Mckinnon 06/10/2020 8:59 AM       Occupational Therapy Discharge Summary    Reason for therapy discharge:    Discharged to home with outpatient therapy.  All goals and outcomes met, no further needs identified.    Progress towards therapy goal(s). See goals on Care Plan in [facility] electronic health record for goal details.  Goals met    Therapy recommendation(s):    Continued therapy is recommended.  Rationale/Recommendations:  OP OT vision rehab.

## 2020-06-10 NOTE — PLAN OF CARE
PRIMARY DIAGNOSIS: Stroke   OUTPATIENT/OBSERVATION GOALS TO BE MET BEFORE DISCHARGE:  1. Orthostatic performed: N/A    2. Diagnostic testing complete & at baseline neurologic testing: Yes    3. Cleared by consultants (if involved): No    4. Interpretation of cardiac rhythm per telemetry tech: SR 60s    5. Tolerating adequate PO diet and medications: Yes    6. Return to near baseline physical activity or neurologic status: Yes    Patient alert and oriented x4. Vitals are Temp: 97.8  F (36.6  C) Temp src: Oral BP: (!) 147/59   Heart Rate: 54 Resp: 18 SpO2: 94 % RA. Denies pain. Neuros intact. Tolerating regular diet. Up with standby assistance. Resting comfortably in bed. Patient to have cardiac monitor placed prior to discharge. Continuing with supportive cares and symptom management.     Discharge Planner Nurse   Safe discharge environment identified: Yes  Barriers to discharge: No       Entered by: Sugey Katz 06/10/2020      Please review provider order for any additional goals.   Nurse to notify provider when observation goals have been met and patient is ready for discharge.

## 2020-06-10 NOTE — PLAN OF CARE
PRIMARY DIAGNOSIS: Stroke   OUTPATIENT/OBSERVATION GOALS TO BE MET BEFORE DISCHARGE:  1. Orthostatic performed: N/A    2. Diagnostic testing complete & at baseline neurologic testing: Yes    3. Cleared by consultants (if involved): No    4. Interpretation of cardiac rhythm per telemetry tech: SR 60s    5. Tolerating adequate PO diet and medications: Yes    6. Return to near baseline physical activity or neurologic status: Yes    Patient alert and oriented x4. Vitals are Temp: 98  F (36.7  C) Temp src: Oral BP: 137/47   Heart Rate: 88 Resp: 16 SpO2: 94 % RA. Denies pain. Neuros intact. Tolerating regular diet. Up with standby assistance. Patient to have cardiac monitor placed prior to discharge. Continuing with supportive cares and symptom management.     Discharge Planner Nurse   Safe discharge environment identified: Yes  Barriers to discharge: No       Entered by: Sugey Katz 06/10/2020      Please review provider order for any additional goals.   Nurse to notify provider when observation goals have been met and patient is ready for discharge.

## 2020-07-28 NOTE — ED TRIAGE NOTES
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  Has had mouth pain for the last couple of days.  Today vomiting.  ABCDs intact.   No

## 2021-01-27 ENCOUNTER — HOSPITAL ENCOUNTER (OUTPATIENT)
Facility: CLINIC | Age: 86
Setting detail: OBSERVATION
Discharge: HOME | End: 2021-01-28
Attending: EMERGENCY MEDICINE | Admitting: HOSPITALIST
Payer: COMMERCIAL

## 2021-01-27 ENCOUNTER — APPOINTMENT (OUTPATIENT)
Dept: CT IMAGING | Facility: CLINIC | Age: 86
End: 2021-01-27
Attending: EMERGENCY MEDICINE
Payer: COMMERCIAL

## 2021-01-27 ENCOUNTER — APPOINTMENT (OUTPATIENT)
Dept: MRI IMAGING | Facility: CLINIC | Age: 86
End: 2021-01-27
Attending: EMERGENCY MEDICINE
Payer: COMMERCIAL

## 2021-01-27 DIAGNOSIS — R42 DIZZINESS: ICD-10-CM

## 2021-01-27 DIAGNOSIS — R51.9 ACUTE INTRACTABLE HEADACHE, UNSPECIFIED HEADACHE TYPE: ICD-10-CM

## 2021-01-27 DIAGNOSIS — R11.2 INTRACTABLE VOMITING WITH NAUSEA, UNSPECIFIED VOMITING TYPE: ICD-10-CM

## 2021-01-27 DIAGNOSIS — R42 VERTIGO: Primary | ICD-10-CM

## 2021-01-27 LAB
ALBUMIN SERPL-MCNC: 3.9 G/DL (ref 3.4–5)
ALBUMIN UR-MCNC: 20 MG/DL
ALP SERPL-CCNC: 78 U/L (ref 40–150)
ALT SERPL W P-5'-P-CCNC: 26 U/L (ref 0–70)
ANION GAP SERPL CALCULATED.3IONS-SCNC: 2 MMOL/L (ref 3–14)
APPEARANCE UR: CLEAR
AST SERPL W P-5'-P-CCNC: 20 U/L (ref 0–45)
BASOPHILS # BLD AUTO: 0.1 10E9/L (ref 0–0.2)
BASOPHILS NFR BLD AUTO: 0.5 %
BILIRUB SERPL-MCNC: 0.5 MG/DL (ref 0.2–1.3)
BILIRUB UR QL STRIP: NEGATIVE
BUN SERPL-MCNC: 18 MG/DL (ref 7–30)
CALCIUM SERPL-MCNC: 9.4 MG/DL (ref 8.5–10.1)
CHLORIDE SERPL-SCNC: 105 MMOL/L (ref 94–109)
CO2 SERPL-SCNC: 32 MMOL/L (ref 20–32)
COLOR UR AUTO: YELLOW
CREAT SERPL-MCNC: 0.81 MG/DL (ref 0.66–1.25)
DIFFERENTIAL METHOD BLD: NORMAL
EOSINOPHIL # BLD AUTO: 0 10E9/L (ref 0–0.7)
EOSINOPHIL NFR BLD AUTO: 0.3 %
ERYTHROCYTE [DISTWIDTH] IN BLOOD BY AUTOMATED COUNT: 12.7 % (ref 10–15)
GFR SERPL CREATININE-BSD FRML MDRD: 81 ML/MIN/{1.73_M2}
GLUCOSE SERPL-MCNC: 117 MG/DL (ref 70–99)
GLUCOSE UR STRIP-MCNC: NEGATIVE MG/DL
HCT VFR BLD AUTO: 48.4 % (ref 40–53)
HGB BLD-MCNC: 16 G/DL (ref 13.3–17.7)
HGB UR QL STRIP: NEGATIVE
IMM GRANULOCYTES # BLD: 0.1 10E9/L (ref 0–0.4)
IMM GRANULOCYTES NFR BLD: 0.7 %
INTERPRETATION ECG - MUSE: NORMAL
KETONES UR STRIP-MCNC: NEGATIVE MG/DL
LEUKOCYTE ESTERASE UR QL STRIP: NEGATIVE
LYMPHOCYTES # BLD AUTO: 0.8 10E9/L (ref 0.8–5.3)
LYMPHOCYTES NFR BLD AUTO: 8.3 %
MCH RBC QN AUTO: 31.7 PG (ref 26.5–33)
MCHC RBC AUTO-ENTMCNC: 33.1 G/DL (ref 31.5–36.5)
MCV RBC AUTO: 96 FL (ref 78–100)
MONOCYTES # BLD AUTO: 0.6 10E9/L (ref 0–1.3)
MONOCYTES NFR BLD AUTO: 5.8 %
MUCOUS THREADS #/AREA URNS LPF: PRESENT /LPF
NEUTROPHILS # BLD AUTO: 8.2 10E9/L (ref 1.6–8.3)
NEUTROPHILS NFR BLD AUTO: 84.4 %
NITRATE UR QL: NEGATIVE
NRBC # BLD AUTO: 0 10*3/UL
NRBC BLD AUTO-RTO: 0 /100
PH UR STRIP: 6.5 PH (ref 5–7)
PLATELET # BLD AUTO: 239 10E9/L (ref 150–450)
POTASSIUM SERPL-SCNC: 3.9 MMOL/L (ref 3.4–5.3)
PROT SERPL-MCNC: 7.5 G/DL (ref 6.8–8.8)
RBC # BLD AUTO: 5.04 10E12/L (ref 4.4–5.9)
RBC #/AREA URNS AUTO: 6 /HPF (ref 0–2)
SODIUM SERPL-SCNC: 139 MMOL/L (ref 133–144)
SOURCE: ABNORMAL
SP GR UR STRIP: 1.02 (ref 1–1.03)
TROPONIN I SERPL-MCNC: <0.015 UG/L (ref 0–0.04)
UROBILINOGEN UR STRIP-MCNC: NORMAL MG/DL (ref 0–2)
WBC # BLD AUTO: 9.7 10E9/L (ref 4–11)
WBC #/AREA URNS AUTO: 1 /HPF (ref 0–5)

## 2021-01-27 PROCEDURE — 84484 ASSAY OF TROPONIN QUANT: CPT | Performed by: EMERGENCY MEDICINE

## 2021-01-27 PROCEDURE — 250N000009 HC RX 250: Performed by: EMERGENCY MEDICINE

## 2021-01-27 PROCEDURE — 258N000003 HC RX IP 258 OP 636: Performed by: HOSPITALIST

## 2021-01-27 PROCEDURE — G0378 HOSPITAL OBSERVATION PER HR: HCPCS

## 2021-01-27 PROCEDURE — 96361 HYDRATE IV INFUSION ADD-ON: CPT

## 2021-01-27 PROCEDURE — 70551 MRI BRAIN STEM W/O DYE: CPT

## 2021-01-27 PROCEDURE — 96375 TX/PRO/DX INJ NEW DRUG ADDON: CPT

## 2021-01-27 PROCEDURE — 93005 ELECTROCARDIOGRAM TRACING: CPT

## 2021-01-27 PROCEDURE — 81001 URINALYSIS AUTO W/SCOPE: CPT | Performed by: EMERGENCY MEDICINE

## 2021-01-27 PROCEDURE — 250N000011 HC RX IP 250 OP 636: Performed by: EMERGENCY MEDICINE

## 2021-01-27 PROCEDURE — 99220 PR INITIAL OBSERVATION CARE,LEVEL III: CPT | Performed by: HOSPITALIST

## 2021-01-27 PROCEDURE — 250N000013 HC RX MED GY IP 250 OP 250 PS 637: Performed by: EMERGENCY MEDICINE

## 2021-01-27 PROCEDURE — 80053 COMPREHEN METABOLIC PANEL: CPT | Performed by: EMERGENCY MEDICINE

## 2021-01-27 PROCEDURE — 99285 EMERGENCY DEPT VISIT HI MDM: CPT | Mod: 25

## 2021-01-27 PROCEDURE — 96374 THER/PROPH/DIAG INJ IV PUSH: CPT | Mod: 59

## 2021-01-27 PROCEDURE — 70450 CT HEAD/BRAIN W/O DYE: CPT

## 2021-01-27 PROCEDURE — 250N000011 HC RX IP 250 OP 636

## 2021-01-27 PROCEDURE — 70496 CT ANGIOGRAPHY HEAD: CPT

## 2021-01-27 PROCEDURE — 258N000003 HC RX IP 258 OP 636: Performed by: EMERGENCY MEDICINE

## 2021-01-27 PROCEDURE — 250N000013 HC RX MED GY IP 250 OP 250 PS 637: Performed by: HOSPITALIST

## 2021-01-27 PROCEDURE — 96376 TX/PRO/DX INJ SAME DRUG ADON: CPT | Mod: 59

## 2021-01-27 PROCEDURE — 85025 COMPLETE CBC W/AUTO DIFF WBC: CPT | Performed by: EMERGENCY MEDICINE

## 2021-01-27 PROCEDURE — 82565 ASSAY OF CREATININE: CPT | Mod: 91 | Performed by: HOSPITALIST

## 2021-01-27 RX ORDER — DIAZEPAM 5 MG
5 TABLET ORAL EVERY 6 HOURS PRN
Status: DISCONTINUED | OUTPATIENT
Start: 2021-01-27 | End: 2021-01-28 | Stop reason: HOSPADM

## 2021-01-27 RX ORDER — ENALAPRILAT 1.25 MG/ML
1.25 INJECTION INTRAVENOUS EVERY 6 HOURS PRN
Status: DISCONTINUED | OUTPATIENT
Start: 2021-01-27 | End: 2021-01-28 | Stop reason: HOSPADM

## 2021-01-27 RX ORDER — ACETAMINOPHEN 325 MG/1
650 TABLET ORAL EVERY 4 HOURS PRN
Status: DISCONTINUED | OUTPATIENT
Start: 2021-01-27 | End: 2021-01-28 | Stop reason: HOSPADM

## 2021-01-27 RX ORDER — KETOROLAC TROMETHAMINE 15 MG/ML
15 INJECTION, SOLUTION INTRAMUSCULAR; INTRAVENOUS ONCE
Status: COMPLETED | OUTPATIENT
Start: 2021-01-27 | End: 2021-01-27

## 2021-01-27 RX ORDER — ACETAMINOPHEN 500 MG
1000 TABLET ORAL AT BEDTIME
Status: DISCONTINUED | OUTPATIENT
Start: 2021-01-27 | End: 2021-01-27 | Stop reason: DRUGHIGH

## 2021-01-27 RX ORDER — SODIUM CHLORIDE 9 MG/ML
INJECTION, SOLUTION INTRAVENOUS CONTINUOUS
Status: DISCONTINUED | OUTPATIENT
Start: 2021-01-27 | End: 2021-01-28 | Stop reason: HOSPADM

## 2021-01-27 RX ORDER — ONDANSETRON 2 MG/ML
INJECTION INTRAMUSCULAR; INTRAVENOUS
Status: COMPLETED
Start: 2021-01-27 | End: 2021-01-27

## 2021-01-27 RX ORDER — ACETAMINOPHEN 650 MG/1
650 SUPPOSITORY RECTAL EVERY 4 HOURS PRN
Status: DISCONTINUED | OUTPATIENT
Start: 2021-01-27 | End: 2021-01-28 | Stop reason: HOSPADM

## 2021-01-27 RX ORDER — CYANOCOBALAMIN 1000 UG/ML
1 INJECTION, SOLUTION INTRAMUSCULAR; SUBCUTANEOUS
Status: ON HOLD | COMMUNITY
End: 2021-01-27

## 2021-01-27 RX ORDER — SODIUM CHLORIDE 9 MG/ML
1000 INJECTION, SOLUTION INTRAVENOUS CONTINUOUS
Status: DISCONTINUED | OUTPATIENT
Start: 2021-01-27 | End: 2021-01-27

## 2021-01-27 RX ORDER — EZETIMIBE 10 MG/1
10 TABLET ORAL DAILY
COMMUNITY

## 2021-01-27 RX ORDER — MECLIZINE HYDROCHLORIDE 25 MG/1
25 TABLET ORAL ONCE
Status: COMPLETED | OUTPATIENT
Start: 2021-01-27 | End: 2021-01-27

## 2021-01-27 RX ORDER — FERROUS SULFATE 325(65) MG
325 TABLET, DELAYED RELEASE (ENTERIC COATED) ORAL
COMMUNITY

## 2021-01-27 RX ORDER — ONDANSETRON 2 MG/ML
4 INJECTION INTRAMUSCULAR; INTRAVENOUS
Status: COMPLETED | OUTPATIENT
Start: 2021-01-27 | End: 2021-01-27

## 2021-01-27 RX ORDER — DIAZEPAM 10 MG/2ML
2.5 INJECTION, SOLUTION INTRAMUSCULAR; INTRAVENOUS ONCE
Status: COMPLETED | OUTPATIENT
Start: 2021-01-27 | End: 2021-01-27

## 2021-01-27 RX ORDER — IOPAMIDOL 755 MG/ML
500 INJECTION, SOLUTION INTRAVASCULAR ONCE
Status: COMPLETED | OUTPATIENT
Start: 2021-01-27 | End: 2021-01-27

## 2021-01-27 RX ORDER — HYDRALAZINE HYDROCHLORIDE 20 MG/ML
5 INJECTION INTRAMUSCULAR; INTRAVENOUS ONCE
Status: COMPLETED | OUTPATIENT
Start: 2021-01-27 | End: 2021-01-27

## 2021-01-27 RX ORDER — ONDANSETRON 4 MG/1
4 TABLET, ORALLY DISINTEGRATING ORAL EVERY 6 HOURS PRN
Status: DISCONTINUED | OUTPATIENT
Start: 2021-01-27 | End: 2021-01-28 | Stop reason: HOSPADM

## 2021-01-27 RX ORDER — METOCLOPRAMIDE HYDROCHLORIDE 5 MG/ML
10 INJECTION INTRAMUSCULAR; INTRAVENOUS ONCE
Status: COMPLETED | OUTPATIENT
Start: 2021-01-27 | End: 2021-01-27

## 2021-01-27 RX ORDER — CALCIUM CARBONATE 500 MG/1
1000 TABLET, CHEWABLE ORAL EVERY 4 HOURS PRN
Status: DISCONTINUED | OUTPATIENT
Start: 2021-01-27 | End: 2021-01-28 | Stop reason: HOSPADM

## 2021-01-27 RX ORDER — ASPIRIN 81 MG/1
81 TABLET, CHEWABLE ORAL DAILY
Status: DISCONTINUED | OUTPATIENT
Start: 2021-01-27 | End: 2021-01-28

## 2021-01-27 RX ORDER — ONDANSETRON 2 MG/ML
4 INJECTION INTRAMUSCULAR; INTRAVENOUS EVERY 6 HOURS PRN
Status: DISCONTINUED | OUTPATIENT
Start: 2021-01-27 | End: 2021-01-28 | Stop reason: HOSPADM

## 2021-01-27 RX ORDER — GABAPENTIN 300 MG/1
900 CAPSULE ORAL AT BEDTIME
Status: DISCONTINUED | OUTPATIENT
Start: 2021-01-27 | End: 2021-01-28 | Stop reason: HOSPADM

## 2021-01-27 RX ORDER — PROCHLORPERAZINE 25 MG
12.5 SUPPOSITORY, RECTAL RECTAL EVERY 12 HOURS PRN
Status: DISCONTINUED | OUTPATIENT
Start: 2021-01-27 | End: 2021-01-28 | Stop reason: HOSPADM

## 2021-01-27 RX ORDER — ROSUVASTATIN CALCIUM 5 MG/1
10 TABLET, COATED ORAL DAILY
Status: DISCONTINUED | OUTPATIENT
Start: 2021-01-27 | End: 2021-01-27

## 2021-01-27 RX ORDER — CLOPIDOGREL BISULFATE 75 MG/1
75 TABLET ORAL DAILY
Status: DISCONTINUED | OUTPATIENT
Start: 2021-01-27 | End: 2021-01-28

## 2021-01-27 RX ORDER — HYDROMORPHONE HYDROCHLORIDE 1 MG/ML
0.2 INJECTION, SOLUTION INTRAMUSCULAR; INTRAVENOUS; SUBCUTANEOUS
Status: DISCONTINUED | OUTPATIENT
Start: 2021-01-27 | End: 2021-01-27

## 2021-01-27 RX ORDER — PROCHLORPERAZINE MALEATE 5 MG
5 TABLET ORAL EVERY 6 HOURS PRN
Status: DISCONTINUED | OUTPATIENT
Start: 2021-01-27 | End: 2021-01-28 | Stop reason: HOSPADM

## 2021-01-27 RX ORDER — MECLIZINE HYDROCHLORIDE 25 MG/1
25 TABLET ORAL 3 TIMES DAILY
Status: DISCONTINUED | OUTPATIENT
Start: 2021-01-27 | End: 2021-01-28 | Stop reason: HOSPADM

## 2021-01-27 RX ADMIN — HYDROMORPHONE HYDROCHLORIDE 0.2 MG: 1 INJECTION, SOLUTION INTRAMUSCULAR; INTRAVENOUS; SUBCUTANEOUS at 13:42

## 2021-01-27 RX ADMIN — METOCLOPRAMIDE HYDROCHLORIDE 10 MG: 5 INJECTION INTRAMUSCULAR; INTRAVENOUS at 12:46

## 2021-01-27 RX ADMIN — SODIUM CHLORIDE 80 ML: 9 INJECTION, SOLUTION INTRAVENOUS at 11:21

## 2021-01-27 RX ADMIN — ONDANSETRON 4 MG: 2 INJECTION INTRAMUSCULAR; INTRAVENOUS at 13:35

## 2021-01-27 RX ADMIN — SODIUM CHLORIDE: 9 INJECTION, SOLUTION INTRAVENOUS at 21:42

## 2021-01-27 RX ADMIN — IOPAMIDOL 70 ML: 755 INJECTION, SOLUTION INTRAVENOUS at 11:21

## 2021-01-27 RX ADMIN — SODIUM CHLORIDE 500 ML: 9 INJECTION, SOLUTION INTRAVENOUS at 12:13

## 2021-01-27 RX ADMIN — KETOROLAC TROMETHAMINE 15 MG: 15 INJECTION, SOLUTION INTRAMUSCULAR; INTRAVENOUS at 12:46

## 2021-01-27 RX ADMIN — HYDRALAZINE HYDROCHLORIDE 5 MG: 20 INJECTION, SOLUTION INTRAMUSCULAR; INTRAVENOUS at 12:13

## 2021-01-27 RX ADMIN — SODIUM CHLORIDE 1000 ML: 9 INJECTION, SOLUTION INTRAVENOUS at 12:50

## 2021-01-27 RX ADMIN — MECLIZINE HYDROCHLORIDE 25 MG: 25 TABLET ORAL at 17:05

## 2021-01-27 RX ADMIN — ONDANSETRON 4 MG: 2 INJECTION INTRAMUSCULAR; INTRAVENOUS at 11:32

## 2021-01-27 RX ADMIN — DIAZEPAM 2.5 MG: 10 INJECTION, SOLUTION INTRAMUSCULAR; INTRAVENOUS at 13:43

## 2021-01-27 RX ADMIN — MECLIZINE HYDROCHLORIDE 25 MG: 25 TABLET ORAL at 21:37

## 2021-01-27 RX ADMIN — CALCIUM CARBONATE (ANTACID) CHEW TAB 500 MG 1000 MG: 500 CHEW TAB at 21:37

## 2021-01-27 ASSESSMENT — ENCOUNTER SYMPTOMS
NUMBNESS: 0
HEADACHES: 1
FREQUENCY: 0
NAUSEA: 1
LIGHT-HEADEDNESS: 1
SHORTNESS OF BREATH: 0
FEVER: 0
DIZZINESS: 1
VOMITING: 0
DYSURIA: 0
PHOTOPHOBIA: 0
WEAKNESS: 0
DIFFICULTY URINATING: 0
SPEECH DIFFICULTY: 0

## 2021-01-27 NOTE — ED NOTES
Pt able to ambulate very short distance, stats stayed at 94% with zero oxygen HR 84 during trial. Unsteady on feet normally does not need stand by assistant. Emesis during trial also showed signs of being nauseous and very weak during process

## 2021-01-27 NOTE — ED NOTES
[facility]  ED Nurse Handoff Report    Edgardo Rice is a 86 year old male   ED Chief complaint: Dizziness and Headache  . ED Diagnosis:   Final diagnoses:   Acute intractable headache, unspecified headache type   Intractable vomiting with nausea, unspecified vomiting type   Dizziness     Allergies:   Allergies   Allergen Reactions     Atorvastatin      Cholestyramine      Diphenhydramine      Droperidol      Morphine      Norco [Hydrocodone-Acetaminophen]      Sulfa Drugs      Tramadol      Tylenol With Codeine [Acetaminophen-Codeine]        Code Status: DNI  Activity level - Baseline/Home:  Independent. Activity Level - Current:   Assist X 2. Lift room needed: No. Bariatric: No   Needed: No   Isolation: No. Infection: Not Applicable.     Vital Signs:   Vitals:    01/27/21 1315 01/27/21 1330 01/27/21 1345 01/27/21 1400   BP: (!) 169/79 (!) 156/81 (!) 163/78 (!) 157/71   Pulse: 77 81 70 74   Resp:       Temp:       TempSrc:       SpO2: 90% 98% 99% 94%       Cardiac Rhythm:  ,      Pain level:    Patient confused: No. Patient Falls Risk: Yes.   Elimination Status: straight cath for urine in ED   Patient Report - Initial Complaint: Dizziness. Focused Assessment: From home, chronic headaches x a couple months.   Has followed with cardiology. Supposed to follow up with neuro soon.  Yesterday developed dizziness with walking, nausea while lying down. No vomiting. Hx of vertigo and feels similar  BP elevated for EMS in 200s.   4 zofran given.   BG 97.    Tests Performed: CT, labs. Abnormal Results:   Abnormal Labs Reviewed   COMPREHENSIVE METABOLIC PANEL - Abnormal; Notable for the following components:       Result Value    Anion Gap 2 (*)     Glucose 117 (*)     All other components within normal limits   ROUTINE UA WITH MICROSCOPIC - Abnormal; Notable for the following components:    Protein Albumin Urine 20 (*)     RBC Urine 6 (*)     Mucous Urine Present (*)     All other components within  normal limits     CTA Head Neck with Contrast   Final Result   IMPRESSION:   HEAD CTA:   1.  Stable mild chronic narrowing of the proximal right posterior   cerebral artery.   2.  No proximal arch vessel occlusion or high-grade intracranial   narrowing.   3.  No aneurysm or arteriovenous malformation.      NECK CTA:   1.  Prior left carotid endarterectomy. Right worse than left carotid   artery atherosclerosis without hemodynamically significant narrowing   by NASCET criteria unchanged versus prior.   2.  Patent vertebral arteries. No dissection.      CELSA BUNCH MD      Head CT w/o contrast   Final Result   IMPRESSION:   No intracranial hemorrhage, mass, or definite CT evidence of recent   ischemia.      CELSA BUNCH MD      MR Brain w/o Contrast    (Results Pending)   .   Treatments provided: nausea management, pain management  Family Comments: n/a  OBS brochure/video discussed/provided to patient:  Yes  ED Medications:   Medications   sodium chloride 0.9% infusion (1,000 mLs Intravenous New Bag 1/27/21 1250)   HYDROmorphone (PF) (DILAUDID) injection 0.2 mg (0.2 mg Intravenous Given 1/27/21 1342)   iopamidol (ISOVUE-370) solution 500 mL (70 mLs Intravenous Given 1/27/21 1121)   CT scan flush (80 mLs Intravenous Given 1/27/21 1121)   ondansetron (ZOFRAN) injection 4 mg (4 mg Intravenous Given 1/27/21 1335)   0.9% sodium chloride BOLUS (0 mLs Intravenous Stopped 1/27/21 1250)   hydrALAZINE (APRESOLINE) injection 5 mg (5 mg Intravenous Given 1/27/21 1213)   ketorolac (TORADOL) injection 15 mg (15 mg Intravenous Given 1/27/21 1246)   metoclopramide (REGLAN) injection 10 mg (10 mg Intravenous Given 1/27/21 1246)   diazepam (VALIUM) injection 2.5 mg (2.5 mg Intravenous Given 1/27/21 1343)     Drips infusing:  Yes  For the majority of the shift, the patient's behavior Green. Interventions performed were n/a.    Sepsis treatment initiated: No     Patient tested for COVID 19 prior to admission: YES    ED  Nurse Name/Phone Number: Dawn Baltazar RN,   2:10 PM    RECEIVING UNIT ED HANDOFF REVIEW    Above ED Nurse Handoff Report was reviewed: Yes  Reviewed by: Erin Muhammad RN on January 27, 2021 at 3:02 PM

## 2021-01-27 NOTE — ED PROVIDER NOTES
"  History   Chief Complaint:  Dizziness and Headache    HPI  Edgardo Rice is a 86 year old male with a history of vertigo, hypertension, hyperlipidemia, and stroke 6/8/20 who presents via EMS for evaluation of a headache with dizziness. He reports struggling with chronic headaches for the last several months and has an upcoming appointment scheduled with neurology for this. This morning, he reports waking up and, even before he got out of bed, he \"knew something was wrong\" as he was feeling dizzy/lightheaded and nauseated. He reports feeling well last night and states he slept soundly from [id number]; he denies waking up with any acute headache, rather he just did not feel quite right upon waking. When the headache, nausea, and dizziness worsened throughout the morning, EMS was called to present him to the ED for evaluation. En route, the paramedics administered 4 mg IV Zofran and noted him to be hypertensive with a systolic pressure in the 200s. Initial blood sugar was 97. At home, he did not take any medications for his symptoms.      Here, he reports this headache feels similar in character and location to his chronic headaches however he has never had one as intense as this one in the past. He localizes the pain to the posterior head with radiation into the back. He states the nausea and dizziness are exacerbated with movement. He denies any vomiting, fevers, photophobia, phonophobia, or vision changes. He further denies unilateral weakness/numbness, speech difficulty, chest pain, shortness of breath, or recent head injury, fall, or other trauma. He also denies any recent urinary symptoms.     From Outside Encounter - 12/21/2020:   MRA Head w/o Contrast:  1.  Limited motion-degraded MRA of the head as described above.  2.  No evidence of proximal large vessel occlusion.  3.  Redemonstrated moderate proximal right P2 segment stenosis.  4.  No evidence of acute infarction.   Reading per radiology.  "     Allergies:  Atorvastatin  Cholestyramine  Diphenhydramine  Droperidol  Morphine  Norco   Sulfa Drugs  Tramadol  Tylenol With Codeine     Medications:    325 mg aspirin   Amlodipine  Prilosec  Gabapentin   Ferrous sulfate  Zetia   Propranolol    Past Medical History:    Vitamin B12 deficiency   Macular degeneration, right eye   Squamous cell carcinoma of buccal mucosa   Left retinal emboli   Lumbar spinal stenosis   Melanoma   BPPV  Insomnia   BPH   Esophageal reflux  Diverticulitis   Hypertension   Hyperlipidemia  Anemia   Occipital stroke   CAD  Peptic ulcer disease    Past Surgical History:    Right knee arthroscopy   Hemorrhoidectomy   Bilateral inguinal hernia repairs   Vasectomy   Left carotid endarterectomy   Cholecystectomy   Tongue biopsy   Multiple BCC excisions   Mohs melanoma excision   Bilateral cataract extraction IOL implant   Ventral hernia repair with mesh     Family History:    Brother: Prostate Cancer   Father: Prostate Cancer   Mother: Heart disease, hyperlipidemia, hypertension, melanoma    Social History:  Presents with EMS  Former smoker.     Review of Systems   Constitutional: Negative for fever.   Eyes: Negative for photophobia and visual disturbance.   Respiratory: Negative for shortness of breath.    Cardiovascular: Negative for chest pain.   Gastrointestinal: Positive for nausea. Negative for vomiting.   Genitourinary: Negative for difficulty urinating, dysuria, frequency and urgency.   Neurological: Positive for dizziness, light-headedness and headaches. Negative for speech difficulty, weakness and numbness.   All other systems reviewed and are negative.      Physical Exam     Patient Vitals for the past 24 hrs:   BP Temp Temp src Pulse Resp SpO2   01/27/21 1935 (!) 149/67 98.7  F (37.1  C) Oral 71 20 93 %   01/27/21 1555 (!) 180/83 99  F (37.2  C) Axillary 72 16 93 %   01/27/21 1400 (!) 157/71 -- -- 74 -- 94 %   01/27/21 1345 (!) 163/78 -- -- 70 -- 99 %   01/27/21 1330 (!) 156/81 --  -- 81 -- 98 %   21 1315 (!) 169/79 -- -- 77 -- 90 %   21 1300 (!) 161/66 -- -- 74 -- 92 %   21 1245 (!) 179/79 -- -- 74 -- 95 %   21 1230 (!) 164/77 -- -- 83 -- 99 %   21 1215 (!) 170/75 -- -- 75 -- 99 %   21 1200 (!) 176/78 -- -- 74 -- 95 %   21 1152 -- 97.5  F (36.4  C) Oral -- -- --   21 1145 (!) 190/77 -- -- 70 -- --   21 1100 (!) 178/73 -- -- 60 -- 97 %   21 1045 (!) 168/69 -- -- 60 -- 96 %   21 1030 (!) 173/133 -- -- 74 -- 90 %   21 1015 (!) 175/82 -- -- -- -- --   21 0912 (!) 168/71 -- -- 66 20 94 %        Physical Exam   General: Alert, well appearing  Neuro:    Alert and oriented x 3.    Pupils 3 mm and reactive to light bilaterally.  Speech is normal and fluent.   Face is symmetric without droop. CN's II-XII grossly intact. Negative pronator drift. Finger to nose symmetric and grossly normal bilaterally.    Right Arm: Good  strength. 5/5 elbow flexion. 5/5 elbow extension. Sensation intact to light touch.   Left Arm: Good  strength. 5/5 elbow flexion. 5/5 elbow extension. Sensation intact to light touch.   Right Le/5 straight leg raise, 5/5 ankle dorsiflexion, 5/5 ankle plantar flexion. Sensation intact to light touch.   Left Le/5 straight leg raise, 5/5 ankle dorsiflexion, 5/5 ankle plantar flexion. Sensation intact to light touch.            HEENT:  Moist mucous membranes.   Conjunctiva normal. No meningismus.  CV:  RRR, no m/r/g, skin warm and well perfused  Pulm:  CTAB, no wheezes/ronchi/rales.  No acute distress, breathing comfortably  GI:  Soft, nontender, nondistended.  No rebound or guarding.  Normal bowel sounds  MSK:  Moving all extremities.  No focal areas of edema, erythema, or tenderness  Skin:  WWP, no rashes, no lower extremity edema, skin color normal, no diaphoresis    Emergency Department Course     ECG:  Indication: Dizziness  Time: 0900  Vent. Rate 64 bpm. NV interval 152. QRS duration 96.  QT/QTc 414/427. P-R-T axis 36 23 -6.    Normal sinus rhythm  Nonspecific ST & T wave abnormality.   No significant change compared to EKG dated 8/25/20. Read time: 1055.    Imaging:  CTA Head Neck with Contrast:  HEAD CTA:  1.  Stable mild chronic narrowing of the proximal right posterior cerebral artery.  2.  No proximal arch vessel occlusion or high-grade intracranial narrowing.  3.  No aneurysm or arteriovenous malformation.  NECK CTA:  1.  Prior left carotid endarterectomy. Right worse than left carotid artery atherosclerosis without hemodynamically significant narrowing by NASCET criteria unchanged versus prior.  2.  Patent vertebral arteries. No dissection.    Reading per radiology.      Head CT w/o contrast:  No intracranial hemorrhage, mass, or definite CT evidence of recent ischemia. Reading per radiology.      MR Brain w/o contrast:  Pending on admission.    Laboratory:  CBC: WBC 9.7, HGB: 16.0, PLT: 239  CMP: Anion gap: 2 (L), Glucose: 117 (H), o/w WNL (Creatinine: 0.81)    Troponin (Collected at 1023): <0.015     UA with microscopic: Protein albumin: 20 (A), RBC: 6 (H), Mucous: Present (A), o/w WNL    Emergency Department Course:    Reviewed:  I reviewed the patient's nursing notes, vitals, past medical records, and Care Everywhere.     Assessments:  1000: I performed an exam of the patient, as documented above. History obtained and plan for ED work up discussed as well.   1320: I reassessed the patient and discussed the results of the work up. He was able to ambulate in the ED but only over a short distance. Vitals were stable but he did vomit once during the trial and the ED tech reported he seemed weak during the process. Patient is amenable to admission at this time.     Consults:   1347: I consulted with Dr. Huynh of the hospitalist services. She is in agreement to accept the patient for admission.    Interventions:  1132: Zofran, 4 mg, IV injection   1213:  mL IV   1213: hydralazine, 5 mg, IV    1246: Toradol, 15 mg, IV injection   1246: Reglan, 10 mg, IV injection   1250: NS 1L IV   1342: Dilaudid, 0.2 mg, IV injection   1343: Valium, 2.5 mg, IV injection    Disposition:  The patient was admitted to the hospital under the care of Dr. Huynh.     Impression & Plan      Medical Decision Making:   Edgardo Rice is a 86 year old male with history of vertigo, stroke, CAD who presents to the ER for evaluation of headache, dizziness, nausea and vomiting.  Please see above for details of HPI and exam.  Patient noted to be hypertensive on arrival.  Otherwise he is afebrile.  He is overall well-appearing and has a nonfocal neurologic exam.  I did consider broad differential including SAH/ICH, hypertensive urgency, TIA/stroke/central vertigo, BPPV, labyrinthitis, vertebral artery dissection, primary headache syndrome amongst others.  Fortunately there have been no fevers or exam findings to suggest meningitis.  CT head and CT head shows no evidence of intracranial bleed.  Given this is within 6 hours of headache onset, I feel this safely rules out SAH and I do not feel he requires LP at this time.  CTA head/neck shows no acute findings.  EKG shows no acute ischemic changes and troponin is within normal limits.  Doubt this represents atypical ACS or other acute cardiopulmonary process.  Is basic laboratory evaluation is otherwise unremarkable.  Patient remains symptomatic in the ER despite the above interventions and given intractable nature of his headache, dizziness, nausea/vomiting we will admit the patient for continued symptomatic care.  Given dizziness worse with position change, this would suggest peripheral etiology.  However, patient has had remote history of stroke presenting with dizziness and nausea/vomiting.  We will therefore obtain MRI which was ordered in the ER.  Discussed case with hospitalist team who accepted the patient and will follow up on imaging study.    Diagnosis:     ICD-10-CM    1. Acute  intractable headache, unspecified headache type  R51.9    2. Intractable vomiting with nausea, unspecified vomiting type  R11.2    3. Dizziness  R42        Scribe Disclosure:  I, Holli Teresa, am serving as a scribe on 1/27/2021 at 10:06 AM to personally document services performed by Yuri Valle MD based on my observations and the provider's statements to me.      1/27/2021   EMERGENCY DEPARTMENT     Yuri Valle MD  01/27/21 1951

## 2021-01-27 NOTE — ED TRIAGE NOTES
From home, chronic headaches x a couple months.   Has followed with cardiology. Supposed to follow up with neuro soon.  Yesterday developed dizziness with walking, nausea while lying down. No vomiting. Hx of vertigo and feels similar  BP elevated for EMS in 200s.   4 zofran given.   BG 97.

## 2021-01-27 NOTE — PHARMACY-ADMISSION MEDICATION HISTORY
Admission medication history interview status for this patient is complete. See [facility] admission navigator for allergy information, prior to admission medications and immunization status.     Medication history interview done via telephone during Covid-19 pandemic, indicate source(s): Patient  Medication history resources (including written lists, pill bottles, clinic record):med list  Pharmacy: -    Changes made to PTA medication list:  Added: zetia, iron, Regular aspiri  Deleted: baby aspirin, lisinopril, plavix, crestor  Changed: tylenol to prn, gabapentin to 900 mg at hs,     Actions taken by pharmacist (provider contacted, etc):called pt for mr ( had med list)     Additional medication history information:None    Medication reconciliation/reorder completed by provider prior to medication history?  yes   (Y/N)     For patients on insulin therapy:   Do you use sliding scale insulin based on blood sugars?   What is your pre-meal insulin coverage?    Do you typically eat three meals a day?   How many times do you check your blood glucose per day?   How many episodes of hypoglycemia do you typically have per month?   Do you have a Continuous Glucose Monitor (CGM)?      Prior to Admission medications    Medication Sig Last Dose Taking? Auth Provider   acetaminophen (TYLENOL) 500 MG tablet Take 1,000 mg by mouth 3 times daily as needed   Yes Unknown, Entered By History   amLODIPine (NORVASC) 10 MG tablet Take 10 mg by mouth At Bedtime 1/26/2021 at Unknown time Yes Unknown, Entered By History   aspirin (ASA) 325 MG EC tablet Take 325 mg by mouth daily 1/27/2021 at Unknown time Yes Unknown, Entered By History   cyanocobalamin (CYANOCOBALAMIN) 1000 MCG/ML injection Inject 1 mL into the muscle every 30 days Past Month at Unknown time Yes Unknown, Entered By History   ezetimibe (ZETIA) 10 MG tablet Take 10 mg by mouth daily 1/27/2021 at Unknown time Yes Unknown, Entered By History   ferrous sulfate (FE TABS) 325 (65 Fe) MG  EC tablet Take 325 mg by mouth 2 times daily (with meals) 1/27/2021 at x1 Yes Unknown, Entered By History   gabapentin (NEURONTIN) 300 MG capsule Take 900 mg by mouth At Bedtime  1/26/2021 at Unknown time Yes Unknown, Entered By History   multivitamin w/minerals (THERA-VIT-M) tablet Take 1 tablet by mouth daily 1/27/2021 at Unknown time Yes Unknown, Entered By History   omeprazole (PRILOSEC) 20 MG DR capsule Take 20 mg by mouth daily 1/27/2021 at Unknown time Yes Unknown, Entered By History

## 2021-01-27 NOTE — H&P
[facility]    History and Physical - Hospitalist Service       Date of Admission:  1/27/2021    Assessment & Plan   Edgardo Rice is a 86 year old male with history of BPPV, reflux esophagitis, HLP, HTN, BPH, CAD, CVA (6/2020), chronic headache admitted on 1/27/2021 with headache, dizziness, nausea and vomiting.    Headache, dizziness, nausea and vomiting    - vertigo vs CVA    - patient states his symptoms are similar to his previous vertigo episodes    - will treat with meclizine, valium and anti-emetics    - to be complete, will have an MRI done (attempted in ED, but patient was vomiting)    - will continue ASA/Plavix until MRI done (he should be on full dose ASA at this point, after his stroke in June 2020)    - IVF until able to take PO    - pain meds for headache    History CVA    - June 2020: R occipital and parietal lobes, no residual effects    - was discharged with ASA/Plavix (at this point should be on full dose asa only)    - declined event monitor, but did have zio patch (SVT, no a fib)    - patient states his symptoms today are different    HLP    - cont pta statin    HTN    - holding pta amlodipine, lisinopril in case he had a CVA    - will order prn enalapril for sBP >190 (as it is unclear if elevated BP is contributing to symptoms and have not bene bale to obtain MRI yet)    Reflux esophagitis    - cont pta omeprazole    Full code: discussed with patient    Called daughter     Diet:  regular  DVT Prophylaxis: Low Risk/Ambulatory with no VTE prophylaxis indicated  Nick Catheter: not present  Code Status:   Full         Disposition Plan   Expected discharge: Tomorrow, recommended to prior living arrangement once work-up done.  Entered: Wellington Huynh MD 01/27/2021, 2:35 PM     The patient's care was discussed with the Bedside Nurse, Patient, Patient's Family and ED Provider.    Wellington Huynh MD  [facility]  Contact information available via [facility]  Paging/Directory      ______________________________________________________________________    Chief Complaint   Headache, dizziness, nausea and vomiting    History is obtained from the patient    History of Present Illness   Edgardo Rice is a 86 year old male with history of BPPV, reflux esophagitis, HLP, HTN, BPH, CAD, CVA (6/2020), chronic headache admitted on 1/27/2021 with headache, dizziness, nausea and vomiting.  Patient states he woke up this morning around 530 and rolled over in the bed and realized he had headache with nausea and dizziness.  He states he vomited about 4 times.  He had called EMS.  EMS notes showed normal vitals except a blood pressure 207/91.  Patient states that any kind of movement makes him nauseous and dizzy.  He states the dizziness is like a lightheadedness.  Does not really say things are spinning.  He states the symptoms are not like his stroke symptoms in June of last year, but more like his vertiginous symptoms.  He states he has been having headaches for the past few months.  They are in the front and in the back of his head coming from his neck.  He states he gets them multiple times a week.  He states his headaches do not respond to anything.  He denies any sensitivity to light.  He denies chest pain, shortness of breath, URI symptoms, urinary symptoms, fevers or chills.  No visual changes.  No numbness or tingling.  No  focal weakness.    Review of Systems    The 10 point Review of Systems is negative other than noted in the HPI or here.     Past Medical History    I have reviewed this patient's medical history and updated it with pertinent information if needed.   Past Medical History:   Diagnosis Date     BPH (benign prostatic hyperplasia)      BPPV (benign paroxysmal positional vertigo)      CAD (coronary artery disease)      CVA (cerebral vascular accident) (H)      Diverticulitis      HLD (hyperlipidemia)      Hypertension      Insomnia      Macular degeneration       Reflux esophagitis        Past Surgical History   I have reviewed this patient's surgical history and updated it with pertinent information if needed.  Past Surgical History:   Procedure Laterality Date     ARTHROSCOPY KNEE Right      CAROTID ENDARTERECTOMY       CHOLECYSTECTOMY       HEMORRHOIDECTOMY       HERNIA REPAIR       HERNIA REPAIR       KERATOPLASTY PENETRATING, VITRECTOMY ANTERIOR, EXTRACAPSULAR CATARACT EXTRACTION, COMBINED Bilateral     W/ intraocular lens implant       Social History   I have reviewed this patient's social history and updated it with pertinent information if needed.  Social History     Tobacco Use     Smoking status: Never Smoker     Smokeless tobacco: Never Used   Substance Use Topics     Alcohol use: Not Currently     Drug use: Not Currently       Family History     Mom: CAD, HLP, HTN  Dad:  when he was younger after prostate surgery and pneumonia    Prior to Admission Medications   Prior to Admission Medications   Prescriptions Last Dose Informant Patient Reported? Taking?   acetaminophen (TYLENOL) 500 MG tablet  Self Yes No   Sig: Take 1,000 mg by mouth At Bedtime   amLODIPine (NORVASC) 10 MG tablet  Self Yes No   Sig: Take 10 mg by mouth At Bedtime   aspirin (ASA) 81 MG chewable tablet   No No   Sig: Take 1 tablet (81 mg) by mouth daily   clopidogrel (PLAVIX) 75 MG tablet   Yes No   Sig: Take 1 tablet (75 mg) by mouth daily   gabapentin (NEURONTIN) 300 MG capsule  Self Yes No   Sig: Take 600 mg by mouth At Bedtime   lisinopril (ZESTRIL) 20 MG tablet  Self Yes No   Sig: Take 40 mg by mouth daily   multivitamin w/minerals (THERA-VIT-M) tablet  Self Yes No   Sig: Take 1 tablet by mouth daily   omeprazole (PRILOSEC) 20 MG DR capsule  Self Yes No   Sig: Take 20 mg by mouth daily   rosuvastatin (CRESTOR) 10 MG tablet   No No   Sig: Take 1 tablet (10 mg) by mouth daily      Facility-Administered Medications: None     Allergies   Allergies   Allergen Reactions     Atorvastatin       Cholestyramine      Diphenhydramine      Droperidol      Morphine      Norco [Hydrocodone-Acetaminophen]      Sulfa Drugs      Tramadol      Tylenol With Codeine [Acetaminophen-Codeine]        Physical Exam   Vital Signs: Temp: 97.5  F (36.4  C) Temp src: Oral BP: (!) 157/71 Pulse: 74   Resp: 20 SpO2: 94 % O2 Device: Nasal cannula Oxygen Delivery: 2 LPM  Weight: 0 lbs 0 oz    Constitutional: awake, alert, cooperative, no apparent distress, and appears stated age  Eyes: Lids and lashes normal, pupils equal, round and reactive to light, extra ocular muscles intact, sclera clear, conjunctiva normal  ENT: Normocephalic, without obvious abnormality, atraumatic, sinuses nontender on palpation, external ears without lesions, oral pharynx with moist mucous membranes, tonsils without erythema or exudates, gums normal and good dentition.  Respiratory: No increased work of breathing, good air exchange, clear to auscultation bilaterally, no crackles or wheezing  Cardiovascular: Normal apical impulse, regular rate and rhythm, normal S1 and S2, no S3 or S4, and no murmur noted  GI: No scars, normal bowel sounds, soft, non-distended, non-tender, no masses palpated, no hepatosplenomegally  Skin: no bruising or bleeding  Neuro: no nystagmus. CN 2-12 grossly intact, strength 5/5 all extemm  Data   Data reviewed today: I reviewed all medications, new labs and imaging results over the last 24 hours. I personally reviewed the EKG tracing showing NSR, no ST/T changes .    Most Recent 3 CBC's:  Recent Labs   Lab Test 01/27/21  1023 06/10/20  0542 06/08/20  1828   WBC 9.7 5.5 6.6   HGB 16.0 11.5* 13.5   MCV 96 94 93    180 210     Most Recent 3 BMP's:  Recent Labs   Lab Test 01/27/21  1023 06/10/20  0542 06/08/20  1828    139 134   POTASSIUM 3.9 3.9 4.0   CHLORIDE 105 107 103   CO2 32 32 28   BUN 18 14 19   CR 0.81 0.98 0.92   ANIONGAP 2* <1* 3   MAO 9.4 8.2* 8.6   * 95 190*     Most Recent 2 LFT's:  Recent Labs   Lab  Test 01/27/21  1023 06/08/20  1828   AST 20 21   ALT 26 23   ALKPHOS 78 78   BILITOTAL 0.5 0.5     Recent Results (from the past 24 hour(s))   Head CT w/o contrast    Narrative    CT HEAD W/O CONTRAST 1/27/2021 11:48 AM    INDICATION: Transient ischemic attack (TIA). Vertigo. Headache and  dizziness.  TECHNIQUE: CT scan of the head without contrast. Dose reduction  techniques were used.  CONTRAST: None.  COMPARISON: Brain MRI 6/8/2020    FINDINGS:   No intracranial hemorrhage, extraaxial collection, mass effect or CT  evidence of acute infarct.  Mild presumed chronic small vessel  ischemic changes. Mild generalized volume loss. The ventricles are  proportional to the sulci. Osseous structures are intact. Unremarkable  orbits. Paranasal sinuses are free of significant disease. Clear  mastoid air cells.      Impression    IMPRESSION:  No intracranial hemorrhage, mass, or definite CT evidence of recent  ischemia.    CELSA BUNCH MD   CTA Head Neck with Contrast    Narrative    CTA  HEAD/NECK WITH CONTRAST January 27, 2021 11:49 AM    INDICATION: Dizziness, non-specific.    TECHNIQUE: Head and neck CT angiogram with IV contrast. CT images of  the head and neck vessels obtained during the arterial phase of  intravenous contrast administration. Axial helical 2D reconstructed  images and multiplanar 3D MIP reconstructed images of the head and  neck vessels were performed on a separate workstation. Dose reduction  techniques were used.  CONTRAST: 70mL [id number].    COMPARISON: Head and neck CTA 6/8/2020.     FINDINGS:  HEAD CTA: Mild stenosis of the proximal right P2 segment of the  posterior cerebral artery unchanged. No high-grade intracranial  stenosis or proximal large vessel occlusion. No aneurysm or AVM.  Patent dural venous sinuses.    NECK CTA: Three vessel arch.  Prior left carotid endarterectomy. Mild  residual narrowing of the distal left common carotid artery. No  interval change. No hemodynamically  significant narrowing of the left  internal carotid artery. Moderate atherosclerotic disease of the right  carotid bifurcation and proximal internal carotid artery. Right  internal carotid artery stenosis estimated at 50% by NASCET criteria,  unchanged in appearance versus prior. Patent vertebral arteries. No  dissection.      Impression    IMPRESSION:  HEAD CTA:  1.  Stable mild chronic narrowing of the proximal right posterior  cerebral artery.  2.  No proximal arch vessel occlusion or high-grade intracranial  narrowing.  3.  No aneurysm or arteriovenous malformation.    NECK CTA:  1.  Prior left carotid endarterectomy. Right worse than left carotid  artery atherosclerosis without hemodynamically significant narrowing  by NASCET criteria unchanged versus prior.  2.  Patent vertebral arteries. No dissection.    CELSA BUNCH MD

## 2021-01-27 NOTE — ED NOTES
Bed: HW01  Expected date: 1/27/21  Expected time: 8:42 AM  Means of arrival: Ambulance  Comments:  [id number] [phone]

## 2021-01-28 VITALS
DIASTOLIC BLOOD PRESSURE: 65 MMHG | SYSTOLIC BLOOD PRESSURE: 163 MMHG | HEART RATE: 67 BPM | OXYGEN SATURATION: 95 % | TEMPERATURE: 98.1 F | RESPIRATION RATE: 18 BRPM

## 2021-01-28 LAB
ANION GAP SERPL CALCULATED.3IONS-SCNC: <1 MMOL/L (ref 3–14)
BASOPHILS # BLD AUTO: 0.1 10E9/L (ref 0–0.2)
BASOPHILS NFR BLD AUTO: 0.6 %
BUN SERPL-MCNC: 17 MG/DL (ref 7–30)
CALCIUM SERPL-MCNC: 8.7 MG/DL (ref 8.5–10.1)
CHLORIDE SERPL-SCNC: 106 MMOL/L (ref 94–109)
CO2 SERPL-SCNC: 33 MMOL/L (ref 20–32)
CREAT BLD-MCNC: 0.9 MG/DL (ref 0.66–1.25)
CREAT SERPL-MCNC: 0.86 MG/DL (ref 0.66–1.25)
DIFFERENTIAL METHOD BLD: ABNORMAL
EOSINOPHIL # BLD AUTO: 0.1 10E9/L (ref 0–0.7)
EOSINOPHIL NFR BLD AUTO: 0.6 %
ERYTHROCYTE [DISTWIDTH] IN BLOOD BY AUTOMATED COUNT: 12.6 % (ref 10–15)
GFR SERPL CREATININE-BSD FRML MDRD: 79 ML/MIN/{1.73_M2}
GFR SERPL CREATININE-BSD FRML MDRD: 80 ML/MIN/{1.73_M2}
GLUCOSE SERPL-MCNC: 82 MG/DL (ref 70–99)
HCT VFR BLD AUTO: 39.5 % (ref 40–53)
HGB BLD-MCNC: 13.2 G/DL (ref 13.3–17.7)
IMM GRANULOCYTES # BLD: 0 10E9/L (ref 0–0.4)
IMM GRANULOCYTES NFR BLD: 0.5 %
LYMPHOCYTES # BLD AUTO: 1.7 10E9/L (ref 0.8–5.3)
LYMPHOCYTES NFR BLD AUTO: 20.8 %
MCH RBC QN AUTO: 32.4 PG (ref 26.5–33)
MCHC RBC AUTO-ENTMCNC: 33.4 G/DL (ref 31.5–36.5)
MCV RBC AUTO: 97 FL (ref 78–100)
MONOCYTES # BLD AUTO: 0.8 10E9/L (ref 0–1.3)
MONOCYTES NFR BLD AUTO: 9.6 %
NEUTROPHILS # BLD AUTO: 5.5 10E9/L (ref 1.6–8.3)
NEUTROPHILS NFR BLD AUTO: 67.9 %
NRBC # BLD AUTO: 0 10*3/UL
NRBC BLD AUTO-RTO: 0 /100
PLATELET # BLD AUTO: 192 10E9/L (ref 150–450)
POTASSIUM SERPL-SCNC: 4 MMOL/L (ref 3.4–5.3)
RBC # BLD AUTO: 4.07 10E12/L (ref 4.4–5.9)
SODIUM SERPL-SCNC: 139 MMOL/L (ref 133–144)
WBC # BLD AUTO: 8.1 10E9/L (ref 4–11)

## 2021-01-28 PROCEDURE — 96361 HYDRATE IV INFUSION ADD-ON: CPT

## 2021-01-28 PROCEDURE — 80048 BASIC METABOLIC PNL TOTAL CA: CPT | Performed by: HOSPITALIST

## 2021-01-28 PROCEDURE — G0378 HOSPITAL OBSERVATION PER HR: HCPCS

## 2021-01-28 PROCEDURE — 36415 COLL VENOUS BLD VENIPUNCTURE: CPT | Performed by: HOSPITALIST

## 2021-01-28 PROCEDURE — 99217 PR OBSERVATION CARE DISCHARGE: CPT | Performed by: HOSPITALIST

## 2021-01-28 PROCEDURE — 85025 COMPLETE CBC W/AUTO DIFF WBC: CPT | Performed by: HOSPITALIST

## 2021-01-28 PROCEDURE — 250N000013 HC RX MED GY IP 250 OP 250 PS 637: Performed by: HOSPITALIST

## 2021-01-28 RX ORDER — AMLODIPINE BESYLATE 10 MG/1
10 TABLET ORAL AT BEDTIME
Status: DISCONTINUED | OUTPATIENT
Start: 2021-01-28 | End: 2021-01-28 | Stop reason: HOSPADM

## 2021-01-28 RX ORDER — CYCLOBENZAPRINE HCL 5 MG
5 TABLET ORAL 3 TIMES DAILY PRN
Qty: 30 TABLET | Refills: 0 | Status: SHIPPED | OUTPATIENT
Start: 2021-01-28

## 2021-01-28 RX ORDER — MECLIZINE HYDROCHLORIDE 25 MG/1
25 TABLET ORAL 3 TIMES DAILY PRN
Qty: 30 TABLET | Refills: 1 | Status: SHIPPED | OUTPATIENT
Start: 2021-01-28

## 2021-01-28 RX ORDER — FERROUS SULFATE 325(65) MG
325 TABLET ORAL 2 TIMES DAILY WITH MEALS
Status: DISCONTINUED | OUTPATIENT
Start: 2021-01-28 | End: 2021-01-28 | Stop reason: HOSPADM

## 2021-01-28 RX ORDER — EZETIMIBE 10 MG/1
10 TABLET ORAL DAILY
Status: DISCONTINUED | OUTPATIENT
Start: 2021-01-28 | End: 2021-01-28 | Stop reason: HOSPADM

## 2021-01-28 RX ADMIN — EZETIMIBE 10 MG: 10 TABLET ORAL at 10:18

## 2021-01-28 RX ADMIN — MECLIZINE HYDROCHLORIDE 25 MG: 25 TABLET ORAL at 10:18

## 2021-01-28 RX ADMIN — GABAPENTIN 900 MG: 300 CAPSULE ORAL at 00:20

## 2021-01-28 RX ADMIN — ACETAMINOPHEN 650 MG: 325 TABLET, FILM COATED ORAL at 00:21

## 2021-01-28 RX ADMIN — OMEPRAZOLE 20 MG: 20 CAPSULE, DELAYED RELEASE ORAL at 10:18

## 2021-01-28 RX ADMIN — FERROUS SULFATE TAB 325 MG (65 MG ELEMENTAL FE) 325 MG: 325 (65 FE) TAB at 10:17

## 2021-01-28 NOTE — PLAN OF CARE
PRIMARY DIAGNOSIS: VERTIGO    OUTPATIENT/OBSERVATION GOALS TO BE MET BEFORE DISCHARGE  1. Orthostatic performed: No    2. Completion of appropriate imaging: Yes    3. Tolerating PO medications: Yes    4. Return to near baseline physical activity: Yes    5. Cleared for discharge by consultants (if involved): No    Vitals are Temp: 98.7  F (37.1  C) Temp src: Oral BP: (!) 143/64 Pulse: 68   Resp: 18 SpO2: 96 %.  Patient is Alert and Oriented x4. He is a 1 Assist with Gait Belt and Walker.  Pt is a Regular diet. He reports having pain but declined any medication intervention when offered.  Patient has Normal Saline 0.9% running at 100 mL per hour. Interpretation of cardiac rhythm per telemetry tech: SB 50's. Possible Discharge today if medically stable.     Discharge Planner Nurse   Safe discharge environment identified: Yes  Barriers to discharge: Yes       Entered by: Dinora Aguirre 01/28/2021 5:15 AM     Please review provider order for any additional goals.   Nurse to notify provider when observation goals have been met and patient is ready for discharge.

## 2021-01-28 NOTE — PLAN OF CARE
PRIMARY DIAGNOSIS: VERTIGO    OUTPATIENT/OBSERVATION GOALS TO BE MET BEFORE DISCHARGE  1. Orthostatic performed: No    2. Completion of appropriate imaging: Yes    3. Tolerating PO medications: Yes    4. Return to near baseline physical activity: Yes    5. Cleared for discharge by consultants (if involved): No    Vitals are Temp: 98.7  F (37.1  C) Temp src: Oral BP: (!) 149/67 Pulse: 71   Resp: 20 SpO2: 93 %.  Patient is Alert and Oriented x4. He are 1 Assist with Gait Belt and Walker.  Pt is a Regular diet. He is denying pain.  Patient has Normal Saline 0.9% running at 100 mL per hour. Went Down for MRI after multiple encouragement. Patient reporting getting MRI makes him sick being still and being in there. Tolerated it well and is back. Interpretation of cardiac rhythm per telemetry tech: SR 60's. Will Continue to monitor.     Discharge Planner Nurse   Safe discharge environment identified: Yes  Barriers to discharge: Yes       Entered by: Dinora Aguirre 01/27/2021 8:26 PM     Please review provider order for any additional goals.   Nurse to notify provider when observation goals have been met and patient is ready for discharge.

## 2021-01-28 NOTE — PLAN OF CARE
PRIMARY DIAGNOSIS: VERTIGO    OUTPATIENT/OBSERVATION GOALS TO BE MET BEFORE DISCHARGE  1. Orthostatic performed: No    2. Completion of appropriate imaging: Yes    3. Tolerating PO medications: Yes    4. Return to near baseline physical activity: Yes    5. Cleared for discharge by consultants (if involved): No    Vitals are Temp: 97.9  F (36.6  C) Temp src: Oral BP: (!) 169/77 Pulse: 78   Resp: 16 SpO2: 96 %.  Patient is Alert and Oriented x4. He is a 1 Assist with Gait Belt and Walker.  Pt is a Regular diet. He reports having pain but declined any medication intervention when offered.  Patient has Normal Saline 0.9% running at 100 mL per hour. MRI results back (see results for details). Interpretation of cardiac rhythm per telemetry tech: SR 60's. Will Continue to monitor.     Discharge Planner Nurse   Safe discharge environment identified: Yes  Barriers to discharge: Yes       Entered by: Dinora Aguirre 01/28/2021 1:56 AM     Please review provider order for any additional goals.   Nurse to notify provider when observation goals have been met and patient is ready for discharge.

## 2021-01-28 NOTE — PLAN OF CARE
PRIMARY DIAGNOSIS: VERTIGO    OUTPATIENT/OBSERVATION GOALS TO BE MET BEFORE DISCHARGE  1. Orthostatic performed: No    2. Completion of appropriate imaging: Yes    3. Tolerating PO medications: Yes    4. Return to near baseline physical activity: Yes    5. Cleared for discharge by consultants (if involved): No    Vitals are Temp: 98.1  F (36.7  C) Temp src: Oral BP: (!) 163/65 Pulse: 67   Resp: 18 SpO2: 95 %.    Patient is Alert and Oriented x4. He is a 1 Assist with Gait Belt and Walker.  Pt is a Regular diet.  Patient is saline locked. Interpretation of cardiac rhythm per telemetry tech: SB 50's. Patient will discharge at 1100.    Discharge Planner Nurse   Safe discharge environment identified: Yes  Barriers to discharge: No       Entered by: Erin Muhammad 01/28/2021     Please review provider order for any additional goals. Nurse to notify provider when observation goals have been met and patient is ready for discharge.

## 2021-01-28 NOTE — DISCHARGE SUMMARY
[facility]  Hospitalist Discharge Summary      Date of Admission:  1/27/2021  Date of Discharge:  1/28/2021  Discharging Provider: Wellington Huynh MD      Discharge Diagnoses   Headache, dizziness. Vertigo, neck spasm    Follow-ups Needed After Discharge   Follow-up Appointments     Follow-up and recommended labs and tests       Follow up with primary care provider, Bibiana Umanzor, within 7   days for hospital follow- up.  No follow up labs or test are needed.             Unresulted Labs Ordered in the Past 30 Days of this Admission     No orders found for last 31 day(s).      These results will be followed up by NA    Discharge Disposition   Discharged to home  Condition at discharge: Stable      Hospital Course   Edgardo Rice is a 86 year old male with history of BPPV, reflux esophagitis, HLP, HTN, BPH, CAD, CVA (6/2020), chronic headache admitted on 1/27/2021 with headache, dizziness, nausea and vomiting.  Patient states he woke up this morning around 530 and rolled over in the bed and realized he had headache with nausea and dizziness.  He states he vomited about 4 times.  He had called EMS.  EMS notes showed normal vitals except a blood pressure 207/91.  Patient states that any kind of movement makes him nauseous and dizzy.  He states the dizziness is like a lightheadedness.  Does not really say things are spinning.  He states the symptoms are not like his stroke symptoms in June of last year, but more like his vertiginous symptoms.  He states he has been having headaches for the past few months.  They are in the front and in the back of his head coming from his neck.  He states he gets them multiple times a week.  He states his headaches do not respond to anything.  He denies any sensitivity to light.  He denies chest pain, shortness of breath, URI symptoms, urinary symptoms, fevers or chills.  No visual changes.  No numbness or tingling.  No  focal weakness.  Patient was  admitted to the Observation Unit. He was started on meclizine and valium. His MRI was negative for acute findings. His symptoms resolved. This am he only has slight dizziness and HA. No nausea or vomiting. Ate breakfast. Ambulated in halls. Ready for discharge. I did call and update his daughter. Will go with flexeril, meclizine and will refer to outpt PT.    Consultations This Hospital Stay   None    Code Status   Full Code    Time Spent on this Encounter   I, Wellington Huynh MD, personally saw the patient today and spent greater than 30 minutes discharging this patient.       Wellington Huynh MD  [facility] OBSERVATION DEPT  [address] [address]  Phone: [phone]  ______________________________________________________________________    Physical Exam   Vital Signs: Temp: 98.1  F (36.7  C) Temp src: Oral BP: (!) 163/65 Pulse: 67   Resp: 18 SpO2: 95 % O2 Device: None (Room air) Oxygen Delivery: 2 LPM  Weight: 0 lbs 0 oz  Constitutional: awake, alert, cooperative, no apparent distress, and appears stated age  Eyes: Lids and lashes normal, pupils equal, round and reactive to light, extra ocular muscles intact, sclera clear, conjunctiva normal  ENT: Normocephalic, without obvious abnormality, atraumatic, sinuses nontender on palpation, external ears without lesions, oral pharynx with moist mucous membranes, tonsils without erythema or exudates, gums normal and good dentition.  Respiratory: No increased work of breathing, good air exchange, clear to auscultation bilaterally, no crackles or wheezing  Cardiovascular: Normal apical impulse, regular rate and rhythm, normal S1 and S2, no S3 or S4, and no murmur noted  GI: No scars, normal bowel sounds, soft, non-distended, non-tender, no masses palpated, no hepatosplenomegally  Musculoskeletal: no lower extremity pitting edema present       Primary Care Physician   [facility]    Discharge Orders      Physical Therapy  Referral      Reason for your hospital stay    Headache, vertigo. Likely headache is due to cervical spine muscle spasm     Follow-up and recommended labs and tests     [facility] follow- up.  No follow up labs or test are needed.     Activity    Your activity upon discharge: activity as tolerated     Diet    Follow this diet upon discharge: Orders Placed This Encounter      Regular Diet Adult       Significant Results and Procedures   Most Recent 3 CBC's:  Recent Labs   Lab Test 01/28/21  0536 01/27/21  1023 06/10/20  0542   WBC 8.1 9.7 5.5   HGB 13.2* 16.0 11.5*   MCV 97 96 94    239 180     Most Recent 3 BMP's:  Recent Labs   Lab Test 01/28/21  0536 01/27/21  1023 06/10/20  0542    139 139   POTASSIUM 4.0 3.9 3.9   CHLORIDE 106 105 107   CO2 33* 32 32   BUN 17 18 14   CR 0.86 0.81 0.98   ANIONGAP <1* 2* <1*   MAO 8.7 9.4 8.2*   GLC 82 117* 95     Most Recent 2 LFT's:  Recent Labs   Lab Test 01/27/21  1023 06/08/20  1828   AST 20 21   ALT 26 23   ALKPHOS 78 78   BILITOTAL 0.5 0.5   ,   Results for orders placed or performed during the hospital encounter of 01/27/21   Head CT w/o contrast    Narrative    CT HEAD W/O CONTRAST 1/27/2021 11:48 AM    INDICATION: Transient ischemic attack (TIA). Vertigo. Headache and  dizziness.  TECHNIQUE: CT scan of the head without contrast. Dose reduction  techniques were used.  CONTRAST: None.  COMPARISON: Brain MRI 6/8/2020    FINDINGS:   No intracranial hemorrhage, extraaxial collection, mass effect or CT  evidence of acute infarct.  Mild presumed chronic small vessel  ischemic changes. Mild generalized volume loss. The ventricles are  proportional to the sulci. Osseous structures are intact. Unremarkable  orbits. Paranasal sinuses are free of significant disease. Clear  mastoid air cells.      Impression    IMPRESSION:  No intracranial hemorrhage, mass, or definite CT evidence of  recent  ischemia.    CELSA BUNCH MD   CTA Head Neck with Contrast    Narrative    CTA  HEAD/NECK WITH CONTRAST January 27, 2021 11:49 AM    INDICATION: Dizziness, non-specific.    TECHNIQUE: Head and neck CT angiogram with IV contrast. CT images of  the head and neck vessels obtained during the arterial phase of  intravenous contrast administration. Axial helical 2D reconstructed  images and multiplanar 3D MIP reconstructed images of the head and  neck vessels were performed on a separate workstation. Dose reduction  techniques were used.  CONTRAST: 70mL Isovue-370.    COMPARISON: Head and neck CTA 6/8/2020.     FINDINGS:  HEAD CTA: Mild stenosis of the proximal right P2 segment of the  posterior cerebral artery unchanged. No high-grade intracranial  stenosis or proximal large vessel occlusion. No aneurysm or AVM.  Patent dural venous sinuses.    NECK CTA: Three vessel arch.  Prior left carotid endarterectomy. Mild  residual narrowing of the distal left common carotid artery. No  interval change. No hemodynamically significant narrowing of the left  internal carotid artery. Moderate atherosclerotic disease of the right  carotid bifurcation and proximal internal carotid artery. Right  internal carotid artery stenosis estimated at 50% by NASCET criteria,  unchanged in appearance versus prior. Patent vertebral arteries. No  dissection.      Impression    IMPRESSION:  HEAD CTA:  1.  Stable mild chronic narrowing of the proximal right posterior  cerebral artery.  2.  No proximal arch vessel occlusion or high-grade intracranial  narrowing.  3.  No aneurysm or arteriovenous malformation.    NECK CTA:  1.  Prior left carotid endarterectomy. Right worse than left carotid  artery atherosclerosis without hemodynamically significant narrowing  by NASCET criteria unchanged versus prior.  2.  Patent vertebral arteries. No dissection.    CELSA BUNCH MD   MR Brain w/o Contrast    Narrative    Clinical History:  Headache, chronic, no new features; Stroke, follow  up    Technique: Multiplanar multisequence MRI brain performed. No  intravenous gadolinium utilized.    Comparison: Head CT 1/27/2020  Brain MRI brain MRI 6/8/2020    Findings: There is mild cerebral volume loss. No restricted diffusion.  There is patchy T2 signal hyperintensity involving the white matter  which is nonspecific, though most likely due to mild small vessel  ischemic disease. The cerebellum, brainstem and orbits are  unremarkable. The flow-voids in the skull base are patent. There is no  evidence of intracranial hemorrhage, mass or infarction. The paranasal  sinuses and mastoid air cells are clear.      Impression    Impression:   1.  1. No acute findings.  2. Nonspecific white matter changes are most likely due to mild small  vessel ischemic disease.    3. Brain MRI is otherwise negative.    LADONNA QUIROZ MD       Discharge Medications   Current Discharge Medication List      START taking these medications    Details   cyclobenzaprine (FLEXERIL) 5 MG tablet Take 1 tablet (5 mg) by mouth 3 times daily as needed for muscle spasms  Qty: 30 tablet, Refills: 0    Associated Diagnoses: Dizziness      meclizine (ANTIVERT) 25 MG tablet Take 1 tablet (25 mg) by mouth 3 times daily as needed for dizziness  Qty: 30 tablet, Refills: 1    Associated Diagnoses: Dizziness         CONTINUE these medications which have NOT CHANGED    Details   acetaminophen (TYLENOL) 500 MG tablet Take 1,000 mg by mouth 3 times daily as needed       amLODIPine (NORVASC) 10 MG tablet Take 10 mg by mouth At Bedtime      aspirin (ASA) 325 MG EC tablet Take 325 mg by mouth daily      ezetimibe (ZETIA) 10 MG tablet Take 10 mg by mouth daily      ferrous sulfate (FE TABS) 325 (65 Fe) MG EC tablet Take 325 mg by mouth 2 times daily (with meals)      gabapentin (NEURONTIN) 300 MG capsule Take 900 mg by mouth At Bedtime       multivitamin w/minerals (THERA-VIT-M) tablet Take 1 tablet by mouth  daily      omeprazole (PRILOSEC) 20 MG DR capsule Take 20 mg by mouth daily           Allergies   Allergies   Allergen Reactions     Atorvastatin      Cholestyramine      Diphenhydramine      Droperidol      Morphine      Norco [Hydrocodone-Acetaminophen]      Sulfa Drugs      Tramadol      Tylenol With Codeine [Acetaminophen-Codeine]

## 2021-01-28 NOTE — PLAN OF CARE
Patient's After Visit Summary was reviewed with patient.   Patient verbalized understanding of After Visit Summary, recommended follow up and was given an opportunity to ask questions.   Discharge medications sent home with patient/family: YES, patient verbalized understanding of education given  Discharged with daughter.    OBSERVATION patient END time: 11:10

## 2021-01-28 NOTE — PLAN OF CARE
ROOM #- 230    Living Situation (if not independent, order SW consult): HOme alone  Facility name:  : [facility]- [phone]    Activity level at baseline: Independent  Activity level on admit: assist of one      Patient registered to observation; given Patient Bill of Rights; given the opportunity to ask questions about observation status and their plan of care.  Patient has been oriented to the observation room, bathroom and call light is in place.    Discussed discharge goals and expectations with patient/family.

## 2021-11-08 ENCOUNTER — HOSPITAL ENCOUNTER (EMERGENCY)
Facility: CLINIC | Age: 86
Discharge: HOME | End: 2021-11-08
Attending: EMERGENCY MEDICINE | Admitting: EMERGENCY MEDICINE
Payer: COMMERCIAL

## 2021-11-08 ENCOUNTER — APPOINTMENT (OUTPATIENT)
Dept: CT IMAGING | Facility: CLINIC | Age: 86
End: 2021-11-08
Attending: EMERGENCY MEDICINE
Payer: COMMERCIAL

## 2021-11-08 VITALS
RESPIRATION RATE: 14 BRPM | HEART RATE: 68 BPM | DIASTOLIC BLOOD PRESSURE: 70 MMHG | OXYGEN SATURATION: 97 % | TEMPERATURE: 97.7 F | SYSTOLIC BLOOD PRESSURE: 158 MMHG

## 2021-11-08 DIAGNOSIS — R51.9 ACUTE NONINTRACTABLE HEADACHE, UNSPECIFIED HEADACHE TYPE: ICD-10-CM

## 2021-11-08 DIAGNOSIS — R42 DIZZINESS: ICD-10-CM

## 2021-11-08 DIAGNOSIS — R53.83 FATIGUE, UNSPECIFIED TYPE: ICD-10-CM

## 2021-11-08 DIAGNOSIS — R11.0 NAUSEA: ICD-10-CM

## 2021-11-08 LAB
ALBUMIN UR-MCNC: NEGATIVE MG/DL
ANION GAP SERPL CALCULATED.3IONS-SCNC: 3 MMOL/L (ref 3–14)
APPEARANCE UR: CLEAR
BASE EXCESS BLDV CALC-SCNC: 3.7 MMOL/L (ref -7.7–1.9)
BASOPHILS # BLD AUTO: 0.1 10E3/UL (ref 0–0.2)
BASOPHILS NFR BLD AUTO: 1 %
BILIRUB UR QL STRIP: NEGATIVE
BUN SERPL-MCNC: 19 MG/DL (ref 7–30)
CALCIUM SERPL-MCNC: 9.2 MG/DL (ref 8.5–10.1)
CHLORIDE BLD-SCNC: 106 MMOL/L (ref 94–109)
CO2 SERPL-SCNC: 30 MMOL/L (ref 20–32)
COLOR UR AUTO: NORMAL
CREAT SERPL-MCNC: 0.94 MG/DL (ref 0.66–1.25)
EOSINOPHIL # BLD AUTO: 0.2 10E3/UL (ref 0–0.7)
EOSINOPHIL NFR BLD AUTO: 2 %
ERYTHROCYTE [DISTWIDTH] IN BLOOD BY AUTOMATED COUNT: 12.1 % (ref 10–15)
ETHANOL SERPL-MCNC: <0.01 G/DL
FLUAV RNA SPEC QL NAA+PROBE: NEGATIVE
FLUBV RNA RESP QL NAA+PROBE: NEGATIVE
GFR SERPL CREATININE-BSD FRML MDRD: 74 ML/MIN/1.73M2
GLUCOSE BLD-MCNC: 107 MG/DL (ref 70–99)
GLUCOSE BLDC GLUCOMTR-MCNC: 102 MG/DL (ref 70–99)
GLUCOSE UR STRIP-MCNC: NEGATIVE MG/DL
HCO3 BLDV-SCNC: 30 MMOL/L (ref 21–28)
HCT VFR BLD AUTO: 49.7 % (ref 40–53)
HGB BLD-MCNC: 16.8 G/DL (ref 13.3–17.7)
HGB UR QL STRIP: NEGATIVE
HOLD SPECIMEN: NORMAL
IMM GRANULOCYTES # BLD: 0.1 10E3/UL
IMM GRANULOCYTES NFR BLD: 1 %
KETONES UR STRIP-MCNC: NEGATIVE MG/DL
LEUKOCYTE ESTERASE UR QL STRIP: NEGATIVE
LYMPHOCYTES # BLD AUTO: 1.3 10E3/UL (ref 0.8–5.3)
LYMPHOCYTES NFR BLD AUTO: 14 %
MCH RBC QN AUTO: 33.5 PG (ref 26.5–33)
MCHC RBC AUTO-ENTMCNC: 33.8 G/DL (ref 31.5–36.5)
MCV RBC AUTO: 99 FL (ref 78–100)
MONOCYTES # BLD AUTO: 0.6 10E3/UL (ref 0–1.3)
MONOCYTES NFR BLD AUTO: 7 %
NEUTROPHILS # BLD AUTO: 6.8 10E3/UL (ref 1.6–8.3)
NEUTROPHILS NFR BLD AUTO: 75 %
NITRATE UR QL: NEGATIVE
NRBC # BLD AUTO: 0 10E3/UL
NRBC BLD AUTO-RTO: 0 /100
O2/TOTAL GAS SETTING VFR VENT: 0 %
PCO2 BLDV: 52 MM HG (ref 40–50)
PH BLDV: 7.37 [PH] (ref 7.32–7.43)
PH UR STRIP: 6.5 [PH] (ref 5–7)
PLATELET # BLD AUTO: 247 10E3/UL (ref 150–450)
PO2 BLDV: 38 MM HG (ref 25–47)
POTASSIUM BLD-SCNC: 4.4 MMOL/L (ref 3.4–5.3)
RBC # BLD AUTO: 5.01 10E6/UL (ref 4.4–5.9)
RBC URINE: <1 /HPF
SARS-COV-2 RNA RESP QL NAA+PROBE: NEGATIVE
SODIUM SERPL-SCNC: 139 MMOL/L (ref 133–144)
SP GR UR STRIP: 1.01 (ref 1–1.03)
TROPONIN I SERPL-MCNC: <0.015 UG/L (ref 0–0.04)
TSH SERPL DL<=0.005 MIU/L-ACNC: 1.88 MU/L (ref 0.4–4)
UROBILINOGEN UR STRIP-MCNC: NORMAL MG/DL
WBC # BLD AUTO: 9 10E3/UL (ref 4–11)
WBC URINE: 0 /HPF

## 2021-11-08 PROCEDURE — 84443 ASSAY THYROID STIM HORMONE: CPT | Performed by: EMERGENCY MEDICINE

## 2021-11-08 PROCEDURE — 87636 SARSCOV2 & INF A&B AMP PRB: CPT | Performed by: EMERGENCY MEDICINE

## 2021-11-08 PROCEDURE — 70450 CT HEAD/BRAIN W/O DYE: CPT

## 2021-11-08 PROCEDURE — 81001 URINALYSIS AUTO W/SCOPE: CPT | Performed by: EMERGENCY MEDICINE

## 2021-11-08 PROCEDURE — 250N000013 HC RX MED GY IP 250 OP 250 PS 637: Performed by: EMERGENCY MEDICINE

## 2021-11-08 PROCEDURE — 93005 ELECTROCARDIOGRAM TRACING: CPT

## 2021-11-08 PROCEDURE — 80048 BASIC METABOLIC PNL TOTAL CA: CPT | Performed by: EMERGENCY MEDICINE

## 2021-11-08 PROCEDURE — C9803 HOPD COVID-19 SPEC COLLECT: HCPCS

## 2021-11-08 PROCEDURE — 84484 ASSAY OF TROPONIN QUANT: CPT | Performed by: EMERGENCY MEDICINE

## 2021-11-08 PROCEDURE — 82803 BLOOD GASES ANY COMBINATION: CPT | Performed by: EMERGENCY MEDICINE

## 2021-11-08 PROCEDURE — 82077 ASSAY SPEC XCP UR&BREATH IA: CPT | Performed by: EMERGENCY MEDICINE

## 2021-11-08 PROCEDURE — 96360 HYDRATION IV INFUSION INIT: CPT

## 2021-11-08 PROCEDURE — 36415 COLL VENOUS BLD VENIPUNCTURE: CPT | Performed by: EMERGENCY MEDICINE

## 2021-11-08 PROCEDURE — 258N000003 HC RX IP 258 OP 636: Performed by: EMERGENCY MEDICINE

## 2021-11-08 PROCEDURE — 99285 EMERGENCY DEPT VISIT HI MDM: CPT | Mod: 25

## 2021-11-08 PROCEDURE — 85025 COMPLETE CBC W/AUTO DIFF WBC: CPT | Performed by: EMERGENCY MEDICINE

## 2021-11-08 RX ORDER — METOCLOPRAMIDE 10 MG/1
10 TABLET ORAL 4 TIMES DAILY PRN
Qty: 20 TABLET | Refills: 0 | Status: SHIPPED | OUTPATIENT
Start: 2021-11-08

## 2021-11-08 RX ORDER — ACETAMINOPHEN 500 MG
1000 TABLET ORAL ONCE
Status: COMPLETED | OUTPATIENT
Start: 2021-11-08 | End: 2021-11-08

## 2021-11-08 RX ORDER — METOCLOPRAMIDE 5 MG/1
10 TABLET ORAL ONCE
Status: COMPLETED | OUTPATIENT
Start: 2021-11-08 | End: 2021-11-08

## 2021-11-08 RX ORDER — ONDANSETRON 4 MG/1
4-8 TABLET, ORALLY DISINTEGRATING ORAL EVERY 8 HOURS PRN
Qty: 12 TABLET | Refills: 0 | Status: SHIPPED | OUTPATIENT
Start: 2021-11-08 | End: 2021-11-11

## 2021-11-08 RX ADMIN — SODIUM CHLORIDE 500 ML: 9 INJECTION, SOLUTION INTRAVENOUS at 14:06

## 2021-11-08 RX ADMIN — ACETAMINOPHEN 1000 MG: 500 TABLET, FILM COATED ORAL at 15:36

## 2021-11-08 RX ADMIN — METOCLOPRAMIDE 10 MG: 5 TABLET ORAL at 15:36

## 2021-11-08 ASSESSMENT — ENCOUNTER SYMPTOMS
PALPITATIONS: 0
MYALGIAS: 0
ABDOMINAL PAIN: 0
NAUSEA: 1
FEVER: 0
LIGHT-HEADEDNESS: 1
HEADACHES: 1
FATIGUE: 1
HEMATURIA: 0
SPEECH DIFFICULTY: 0
FREQUENCY: 0
DIARRHEA: 0
SHORTNESS OF BREATH: 0
DIZZINESS: 1

## 2021-11-08 NOTE — ED NOTES
Bed: ED28  Expected date: 11/8/21  Expected time:   Means of arrival: Ambulance  Comments:  Bib[facility] Shukla 86 yr dizziness

## 2021-11-08 NOTE — ED TRIAGE NOTES
Pt to ED via Ems from home with c/o int dizziness and lightheadedness x1 week. Hx of vertigo. 4 mg PO zofran given pta with great relief of symptoms. A/o x4. Hypertensive.

## 2021-11-08 NOTE — DISCHARGE INSTRUCTIONS
Prescription strength dosing instructions:  - 600mg ibuprofen (Advil, Motrin) every 6 hours as needed for fever/pain/inflammation.  Naprosyn (Naproxen, Aleve) works similarly and can be used instead, if preferred.  - 650mg acetaminophen (tylenol) every 4 hours or 1000mg every 6 hours (maximum of 4000mg in 24 hours).  Acetaminophen is often in combination over the counter and prescription pain medications.  Be sure to include this in daily total.    These medications work differently and can be used in combination.      Discharge Instructions  Headache    You were seen today for a headache. Headaches may be caused by many different things such as muscle tension, sinus inflammation, anxiety and stress, having too little sleep, too much alcohol, some medical conditions or injury. You may have a migraine, which is caused by changes in the blood vessels in your head.  At this time your provider does not find that your headache is a sign of anything dangerous or life-threatening.  However, sometimes the signs of serious illness do not show up right away.      Generally, every Emergency Department visit should have a follow-up clinic visit with either a primary or a specialty clinic/provider. Please follow-up as instructed by your emergency provider today.    Return to the Emergency Department if:  You get a new fever of 100.4 F or higher.  Your headache gets much worse.  You get a stiff neck with your headache.  You get a new headache that is significantly different or worse than headaches you have had before.  You are vomiting (throwing up) and cannot keep food or water down.  You have blurry or double vision or other problems with your eyes.  You have a new weakness on one side of your body.  You have difficulty with balance which is new.  You or your family thinks you are confused.  You have a seizure.    What can I do to help myself?  Pain medications - You may take a pain medication such as Tylenol  (acetaminophen),  Advil , Motrin  (ibuprofen) or Aleve  (naproxen).  Take a pain reliever as soon as you notice symptoms.  Starting medications as soon as you start to have symptoms may lessen the amount of pain you have.  Relaxing in a quiet, dark room may help.  Get enough sleep and eat meals regularly.  You may need to watch for certain foods or other things which may trigger your headaches.  Keeping a journal of your headaches and possible triggers may help you and your primary provider to identify things which you should avoid which may be causing your headaches.  If you were given a prescription for medicine here today, be sure to read all of the information (including the package insert) that comes with your prescription.  This will include important information about the medicine, its side effects, and any warnings that you need to know about.  The pharmacist who fills the prescription can provide more information and answer questions you may have about the medicine.  If you have questions or concerns that the pharmacist cannot address, please call or return to the Emergency Department.   Remember that you can always come back to the Emergency Department if you are not able to see your regular provider in the amount of time listed above, if you get any new symptoms, or if there is anything that worries you.

## 2021-11-08 NOTE — ED PROVIDER NOTES
History   Chief Complaint:  Dizziness       The history is provided by the patient and the EMS personnel.      Edgardo Rice is a 86 year old male with history of CVA, coronary artery disease, hypertension, and hyperlipidemia who presents via EMS for evaluation of dizziness. Edgardo has felt lightheaded and dizzy for the past week with intermittent mild frontal headache. His lightheadedness is exacerbated when standing up from sitting. He has a history of vertigo and denies a room spinning sensation today. He reports increasing fatigue lately and noting he is unable to fall asleep at night due to history of insomnia except for the last two nights where he slept through the night. He reports mild nausea that improved with 4 mg Zofran given by EMS. He has taken Tylenol at home with mild improvement of his headache. No palpitations or chest pain. No shortness of breath, fever, of body aches. No syncope, difficulty walking, feeling off balance, or speech difficulty. He notes blurry vision while watching television this morning but otherwise melissa visual disturbance. No abdominal pain or diarrhea. No frequent urination or hematuria. He denies cardiac history including MI or surgery. He denies current tobacco use.     Review of Systems   Constitutional: Positive for fatigue. Negative for fever.   Eyes: Positive for visual disturbance.   Respiratory: Negative for shortness of breath.    Cardiovascular: Negative for chest pain and palpitations.   Gastrointestinal: Positive for nausea. Negative for abdominal pain and diarrhea.   Genitourinary: Negative for frequency and hematuria.   Musculoskeletal: Negative for myalgias.   Neurological: Positive for dizziness, light-headedness and headaches. Negative for syncope and speech difficulty.   All other systems reviewed and are negative.    Allergies:  Atorvastatin  Cholestyramine  Diphenhydramine  Droperidol  Morphine  Norco [Hydrocodone-Acetaminophen]  Sulfa  Drugs  Tramadol  Tylenol With Codeine [Acetaminophen-Codeine]    Medications:  Amlodipine   Aspirin 325 mg  Cyclobenzaprine   Ezetimibe   Ferrous sulfate   Gabapentin   Meclizine   Multivitamin w/minerals   Omeprazole     Past Medical History:     Basal cell carcinoma   BPH  Benign paroxysmal positional vertigo  Coronary artery disease  CVA  Diverticulitis  Hyperlipidemia  Hypertension  Insomnia  Macular degeneration   Reflux esophagitis  Tobacco use disorder     Past Surgical History:    Arthroscopy knee, right  Carotid endardectomy  Cholecystectomy  Hemorrhoidectomy   Hernia repair  Keratoplasty penetrating, vitrectomy anterior, extracapsular cataract extraction, combined     Family History:    Prostate cancer  Hyperlipidemia  Hypertension  Heart disease     Social History:  Presents unaccompanied via EMS  Denies current tobacco use    Physical Exam     Patient Vitals for the past 24 hrs:   BP Temp Temp src Pulse Resp SpO2   11/08/21 1505 -- -- -- 68 14 97 %   11/08/21 1500 (!) 158/70 -- -- -- -- --   11/08/21 1420 (!) 148/71 -- -- 70 27 93 %   11/08/21 1415 (!) 153/72 -- -- 67 10 96 %   11/08/21 1400 (!) 167/69 -- -- 72 20 94 %   11/08/21 1345 -- -- -- 80 21 94 %   11/08/21 1344 (!) 171/83 97.7  F (36.5  C) Oral 76 20 95 %       Physical Exam  Eyes:               Sclera white; Pupils are equal and round  ENT:                External ears and nares normal  CV:                  Rate as above with regular rhythm   Resp:               Breath sounds clear and equal bilaterally                          Non-labored, no retractions or accessory muscle use  GI:                   Abdomen is soft, non-tender, non-distended                          No rebound tenderness or peritoneal features  MS:                  Moves all extremities  Skin:                Warm and dry  Neuro:             Speech is normal and fluent. No apparent deficit.    Emergency Department Course   ECG  ECG obtained at 1351, ECG read at 1356  Normal  sinus rhythm. Nonspecific ST abnormality. Abnormal ECG.   No significant change as compared to prior, dated 1/27/21.  Rate 74 bpm. MA interval 144 ms. QRS duration 86 ms. QT/QTc 388/430 ms. P-R-T axes 33 14 -7.     Imaging:  Head CT w/o contrast   Final Result   IMPRESSION:      1. No evidence of acute intracranial hemorrhage, mass, or herniation.   2. Mild diffuse parenchymal volume loss and white matter changes   likely due to chronic microvascular ischemic disease.       VIOLETTE WICK MD            SYSTEM ID:  [id number]        Report per radiology    Laboratory:  Labs Ordered and Resulted from Time of ED Arrival to Time of ED Departure   BASIC METABOLIC PANEL - Abnormal       Result Value    Sodium 139      Potassium 4.4      Chloride 106      Carbon Dioxide (CO2) 30      Anion Gap 3      Urea Nitrogen 19      Creatinine 0.94      Calcium 9.2      Glucose 107 (*)     GFR Estimate 74     BLOOD GAS VENOUS - Abnormal    pH Venous 7.37      pCO2 Venous 52 (*)     pO2 Venous 38      Bicarbonate Venous 30 (*)     Base Excess/Deficit (+/-) 3.7 (*)     FIO2 0     GLUCOSE BY METER - Abnormal    GLUCOSE BY METER POCT 102 (*)    CBC WITH PLATELETS AND DIFFERENTIAL - Abnormal    WBC Count 9.0      RBC Count 5.01      Hemoglobin 16.8      Hematocrit 49.7      MCV 99      MCH 33.5 (*)     MCHC 33.8      RDW 12.1      Platelet Count 247      % Neutrophils 75      % Lymphocytes 14      % Monocytes 7      % Eosinophils 2      % Basophils 1      % Immature Granulocytes 1      NRBCs per 100 WBC 0      Absolute Neutrophils 6.8      Absolute Lymphocytes 1.3      Absolute Monocytes 0.6      Absolute Eosinophils 0.2      Absolute Basophils 0.1      Absolute Immature Granulocytes 0.1 (*)     Absolute NRBCs 0.0     TROPONIN I - Normal    Troponin I <0.015     TSH WITH FREE T4 REFLEX - Normal    TSH 1.88     ETHYL ALCOHOL LEVEL - Normal    Alcohol ethyl <0.01     ROUTINE UA WITH MICROSCOPIC REFLEX TO CULTURE - Normal    Color Urine Straw       Appearance Urine Clear      Glucose Urine Negative      Bilirubin Urine Negative      Ketones Urine Negative      Specific Gravity Urine 1.007      Blood Urine Negative      pH Urine 6.5      Protein Albumin Urine Negative      Urobilinogen Urine Normal      Nitrite Urine Negative      Leukocyte Esterase Urine Negative      RBC Urine <1      WBC Urine 0     INFLUENZA A/B & SARS-COV2 PCR MULTIPLEX - Normal    Influenza A target Negative      Influenza B target Negative      SARS CoV2 PCR Negative          Emergency Department Course:  Reviewed:  I reviewed nursing notes, vitals, past medical history and Care Everywhere    Assessments:  1346 I obtained history and examined the patient as noted above.   1519 I rechecked the patient and explained findings.     Interventions:  1406 0.9% sodium chloride bolus, 500 ml, IV  1536 Reglan, 10 mg, PO  1536 Tylenol, 1,000 mg, PO    Disposition:  The patient was discharged to home.     Impression & Plan     Medical Decision Making:  Edgardo Rice is a 86 year old male here for evaluation of dizziness that does not feel like vertigo. His associated fatigue and nausea could suggest a cardiac process or infectious process. He feels orthostatic and IV fluids were given. Ultimately blood work shows no evidence of sepsis, renal insufficiency, cardiac abnormalities, or dysrhythmia. He felt improved after nausea medicines with EMS. His persistent headache is not secondary to intracranial bleeding.  Not thunderclap or severe and LP not indicated.  Additional medications will be directed at this. He will be following up with his primary care physician and returning with worsening symptoms.       Covid-19  Edgardo Rice was evaluated during a global COVID-19 pandemic, which necessitated consideration that the patient might be at risk for infection with the SARS-CoV-2 virus that causes COVID-19.   Applicable protocols for evaluation were followed during the patient's care.   COVID-19  was considered as part of the patient's evaluation. The plan for testing is:  a test was obtained during this visit.      Diagnosis:    ICD-10-CM    1. Acute nonintractable headache, unspecified headache type  R51.9    2. Nausea  R11.0    3. Dizziness  R42    4. Fatigue, unspecified type  R53.83        Discharge Medications:  Discharge Medication List as of 11/8/2021  3:33 PM      START taking these medications    Details   metoclopramide (REGLAN) 10 MG tablet Take 1 tablet (10 mg) by mouth 4 times daily as needed (nausea, headache), Disp-20 tablet, R-0, E-Prescribe      ondansetron (ZOFRAN ODT) 4 MG ODT tab Take 1-2 tablets (4-8 mg) by mouth every 8 hours as needed for nausea, Disp-12 tablet, R-0, E-Prescribe             Scribe Disclosure:  Isis SUMMERS, am serving as a scribe at 1:45 PM on 11/8/2021 to document services personally performed by Nurys Martin MD based on my observations and the provider's statements to me.             Nurys Martin MD  11/08/21 4909

## 2021-11-09 LAB
ATRIAL RATE - MUSE: 74 BPM
DIASTOLIC BLOOD PRESSURE - MUSE: NORMAL MMHG
INTERPRETATION ECG - MUSE: NORMAL
P AXIS - MUSE: 33 DEGREES
PR INTERVAL - MUSE: 144 MS
QRS DURATION - MUSE: 86 MS
QT - MUSE: 388 MS
QTC - MUSE: 430 MS
R AXIS - MUSE: 14 DEGREES
SYSTOLIC BLOOD PRESSURE - MUSE: NORMAL MMHG
T AXIS - MUSE: -7 DEGREES
VENTRICULAR RATE- MUSE: 74 BPM

## 2023-01-26 ENCOUNTER — APPOINTMENT (OUTPATIENT)
Dept: CT IMAGING | Facility: CLINIC | Age: 88
End: 2023-01-26
Attending: PHYSICIAN ASSISTANT
Payer: COMMERCIAL

## 2023-01-26 ENCOUNTER — HOSPITAL ENCOUNTER (OUTPATIENT)
Facility: CLINIC | Age: 88
Setting detail: OBSERVATION
Discharge: HOME | End: 2023-01-27
Attending: PHYSICIAN ASSISTANT | Admitting: HOSPITALIST
Payer: COMMERCIAL

## 2023-01-26 DIAGNOSIS — R11.10 INTRACTABLE VOMITING: ICD-10-CM

## 2023-01-26 DIAGNOSIS — R19.7 DIARRHEA, UNSPECIFIED TYPE: ICD-10-CM

## 2023-01-26 LAB
ANION GAP SERPL CALCULATED.3IONS-SCNC: 10 MMOL/L (ref 7–15)
BUN SERPL-MCNC: 15.6 MG/DL (ref 8–23)
CALCIUM SERPL-MCNC: 9.2 MG/DL (ref 8.8–10.2)
CHLORIDE SERPL-SCNC: 98 MMOL/L (ref 98–107)
CREAT SERPL-MCNC: 0.76 MG/DL (ref 0.67–1.17)
DEPRECATED HCO3 PLAS-SCNC: 26 MMOL/L (ref 22–29)
ERYTHROCYTE [DISTWIDTH] IN BLOOD BY AUTOMATED COUNT: 12.4 % (ref 10–15)
FLUAV RNA SPEC QL NAA+PROBE: NEGATIVE
FLUBV RNA RESP QL NAA+PROBE: NEGATIVE
GFR SERPL CREATININE-BSD FRML MDRD: 87 ML/MIN/1.73M2
GLUCOSE SERPL-MCNC: 144 MG/DL (ref 70–99)
HCT VFR BLD AUTO: 43 % (ref 40–53)
HGB BLD-MCNC: 14.9 G/DL (ref 13.3–17.7)
HOLD SPECIMEN: NORMAL
MCH RBC QN AUTO: 32.9 PG (ref 26.5–33)
MCHC RBC AUTO-ENTMCNC: 34.7 G/DL (ref 31.5–36.5)
MCV RBC AUTO: 95 FL (ref 78–100)
PLATELET # BLD AUTO: 206 10E3/UL (ref 150–450)
POTASSIUM SERPL-SCNC: 3.8 MMOL/L (ref 3.4–5.3)
RBC # BLD AUTO: 4.53 10E6/UL (ref 4.4–5.9)
RSV RNA SPEC NAA+PROBE: NEGATIVE
SARS-COV-2 RNA RESP QL NAA+PROBE: NEGATIVE
SODIUM SERPL-SCNC: 134 MMOL/L (ref 136–145)
WBC # BLD AUTO: 6.8 10E3/UL (ref 4–11)

## 2023-01-26 PROCEDURE — 80048 BASIC METABOLIC PNL TOTAL CA: CPT | Performed by: PHYSICIAN ASSISTANT

## 2023-01-26 PROCEDURE — 85027 COMPLETE CBC AUTOMATED: CPT | Performed by: PHYSICIAN ASSISTANT

## 2023-01-26 PROCEDURE — 80048 BASIC METABOLIC PNL TOTAL CA: CPT | Performed by: EMERGENCY MEDICINE

## 2023-01-26 PROCEDURE — 96361 HYDRATE IV INFUSION ADD-ON: CPT

## 2023-01-26 PROCEDURE — 250N000011 HC RX IP 250 OP 636: Performed by: PHYSICIAN ASSISTANT

## 2023-01-26 PROCEDURE — 258N000003 HC RX IP 258 OP 636: Performed by: PHYSICIAN ASSISTANT

## 2023-01-26 PROCEDURE — 250N000013 HC RX MED GY IP 250 OP 250 PS 637: Performed by: PHYSICIAN ASSISTANT

## 2023-01-26 PROCEDURE — 250N000011 HC RX IP 250 OP 636: Performed by: EMERGENCY MEDICINE

## 2023-01-26 PROCEDURE — 74177 CT ABD & PELVIS W/CONTRAST: CPT

## 2023-01-26 PROCEDURE — 36415 COLL VENOUS BLD VENIPUNCTURE: CPT | Performed by: EMERGENCY MEDICINE

## 2023-01-26 PROCEDURE — 96374 THER/PROPH/DIAG INJ IV PUSH: CPT

## 2023-01-26 PROCEDURE — 250N000009 HC RX 250: Performed by: PHYSICIAN ASSISTANT

## 2023-01-26 PROCEDURE — 99285 EMERGENCY DEPT VISIT HI MDM: CPT | Mod: 25,CS

## 2023-01-26 PROCEDURE — 99222 1ST HOSP IP/OBS MODERATE 55: CPT | Performed by: PHYSICIAN ASSISTANT

## 2023-01-26 PROCEDURE — G0378 HOSPITAL OBSERVATION PER HR: HCPCS

## 2023-01-26 PROCEDURE — 96375 TX/PRO/DX INJ NEW DRUG ADDON: CPT

## 2023-01-26 PROCEDURE — 258N000003 HC RX IP 258 OP 636: Performed by: EMERGENCY MEDICINE

## 2023-01-26 PROCEDURE — 87637 SARSCOV2&INF A&B&RSV AMP PRB: CPT | Performed by: PHYSICIAN ASSISTANT

## 2023-01-26 PROCEDURE — 85027 COMPLETE CBC AUTOMATED: CPT | Performed by: EMERGENCY MEDICINE

## 2023-01-26 PROCEDURE — C9803 HOPD COVID-19 SPEC COLLECT: HCPCS

## 2023-01-26 PROCEDURE — 96376 TX/PRO/DX INJ SAME DRUG ADON: CPT

## 2023-01-26 RX ORDER — IOPAMIDOL 755 MG/ML
500 INJECTION, SOLUTION INTRAVASCULAR ONCE
Status: COMPLETED | OUTPATIENT
Start: 2023-01-26 | End: 2023-01-26

## 2023-01-26 RX ORDER — ONDANSETRON 2 MG/ML
4 INJECTION INTRAMUSCULAR; INTRAVENOUS EVERY 6 HOURS PRN
Status: DISCONTINUED | OUTPATIENT
Start: 2023-01-26 | End: 2023-01-27 | Stop reason: HOSPADM

## 2023-01-26 RX ORDER — MECLIZINE HYDROCHLORIDE 25 MG/1
25 TABLET ORAL 3 TIMES DAILY PRN
Status: DISCONTINUED | OUTPATIENT
Start: 2023-01-26 | End: 2023-01-27 | Stop reason: HOSPADM

## 2023-01-26 RX ORDER — ONDANSETRON 2 MG/ML
4 INJECTION INTRAMUSCULAR; INTRAVENOUS ONCE
Status: COMPLETED | OUTPATIENT
Start: 2023-01-26 | End: 2023-01-26

## 2023-01-26 RX ORDER — GABAPENTIN 300 MG/1
900 CAPSULE ORAL AT BEDTIME
Status: DISCONTINUED | OUTPATIENT
Start: 2023-01-26 | End: 2023-01-27 | Stop reason: HOSPADM

## 2023-01-26 RX ORDER — PROCHLORPERAZINE MALEATE 5 MG
5 TABLET ORAL EVERY 6 HOURS PRN
Status: DISCONTINUED | OUTPATIENT
Start: 2023-01-26 | End: 2023-01-27 | Stop reason: HOSPADM

## 2023-01-26 RX ORDER — OLANZAPINE 10 MG/1
1.25 INJECTION, POWDER, LYOPHILIZED, FOR SOLUTION INTRAMUSCULAR ONCE
Status: COMPLETED | OUTPATIENT
Start: 2023-01-26 | End: 2023-01-26

## 2023-01-26 RX ORDER — ONDANSETRON 4 MG/1
4 TABLET, ORALLY DISINTEGRATING ORAL EVERY 6 HOURS PRN
Status: DISCONTINUED | OUTPATIENT
Start: 2023-01-26 | End: 2023-01-27 | Stop reason: HOSPADM

## 2023-01-26 RX ORDER — AMLODIPINE BESYLATE 10 MG/1
10 TABLET ORAL AT BEDTIME
Status: DISCONTINUED | OUTPATIENT
Start: 2023-01-26 | End: 2023-01-27 | Stop reason: HOSPADM

## 2023-01-26 RX ORDER — PROCHLORPERAZINE 25 MG
12.5 SUPPOSITORY, RECTAL RECTAL EVERY 12 HOURS PRN
Status: DISCONTINUED | OUTPATIENT
Start: 2023-01-26 | End: 2023-01-27 | Stop reason: HOSPADM

## 2023-01-26 RX ORDER — ACETAMINOPHEN 500 MG
1000 TABLET ORAL 3 TIMES DAILY PRN
Status: DISCONTINUED | OUTPATIENT
Start: 2023-01-26 | End: 2023-01-27 | Stop reason: HOSPADM

## 2023-01-26 RX ORDER — METOCLOPRAMIDE HYDROCHLORIDE 5 MG/ML
10 INJECTION INTRAMUSCULAR; INTRAVENOUS ONCE
Status: COMPLETED | OUTPATIENT
Start: 2023-01-26 | End: 2023-01-26

## 2023-01-26 RX ORDER — LIDOCAINE 40 MG/G
CREAM TOPICAL
Status: DISCONTINUED | OUTPATIENT
Start: 2023-01-26 | End: 2023-01-27 | Stop reason: HOSPADM

## 2023-01-26 RX ORDER — SODIUM CHLORIDE, SODIUM LACTATE, POTASSIUM CHLORIDE, CALCIUM CHLORIDE 600; 310; 30; 20 MG/100ML; MG/100ML; MG/100ML; MG/100ML
INJECTION, SOLUTION INTRAVENOUS CONTINUOUS
Status: ACTIVE | OUTPATIENT
Start: 2023-01-26 | End: 2023-01-27

## 2023-01-26 RX ADMIN — SODIUM CHLORIDE, POTASSIUM CHLORIDE, SODIUM LACTATE AND CALCIUM CHLORIDE 1000 ML: 600; 310; 30; 20 INJECTION, SOLUTION INTRAVENOUS at 15:33

## 2023-01-26 RX ADMIN — SODIUM CHLORIDE, POTASSIUM CHLORIDE, SODIUM LACTATE AND CALCIUM CHLORIDE: 600; 310; 30; 20 INJECTION, SOLUTION INTRAVENOUS at 18:06

## 2023-01-26 RX ADMIN — AMLODIPINE BESYLATE 10 MG: 10 TABLET ORAL at 21:58

## 2023-01-26 RX ADMIN — SODIUM CHLORIDE 500 ML: 9 INJECTION, SOLUTION INTRAVENOUS at 10:15

## 2023-01-26 RX ADMIN — GABAPENTIN 900 MG: 300 CAPSULE ORAL at 21:58

## 2023-01-26 RX ADMIN — OLANZAPINE 1.25 MG: 10 INJECTION, POWDER, FOR SOLUTION INTRAMUSCULAR at 15:33

## 2023-01-26 RX ADMIN — ONDANSETRON 4 MG: 2 INJECTION INTRAMUSCULAR; INTRAVENOUS at 10:15

## 2023-01-26 RX ADMIN — ONDANSETRON 4 MG: 2 INJECTION INTRAMUSCULAR; INTRAVENOUS at 14:54

## 2023-01-26 RX ADMIN — SODIUM CHLORIDE 55 ML: 9 INJECTION, SOLUTION INTRAVENOUS at 14:36

## 2023-01-26 RX ADMIN — IOPAMIDOL 75 ML: 755 INJECTION, SOLUTION INTRAVENOUS at 14:36

## 2023-01-26 RX ADMIN — METOCLOPRAMIDE HYDROCHLORIDE 10 MG: 5 INJECTION INTRAMUSCULAR; INTRAVENOUS at 13:54

## 2023-01-26 ASSESSMENT — PATIENT HEALTH QUESTIONNAIRE - PHQ9: SUM OF ALL RESPONSES TO PHQ9 QUESTIONS 1 & 2: 0

## 2023-01-26 ASSESSMENT — ENCOUNTER SYMPTOMS
DIARRHEA: 1
ABDOMINAL PAIN: 0
VOMITING: 1
NAUSEA: 1
BLOOD IN STOOL: 0

## 2023-01-26 ASSESSMENT — ACTIVITIES OF DAILY LIVING (ADL)
ADLS_ACUITY_SCORE: 35
ADLS_ACUITY_SCORE: 24
ADLS_ACUITY_SCORE: 35
ADLS_ACUITY_SCORE: 35
ADLS_ACUITY_SCORE: 24

## 2023-01-26 NOTE — PHARMACY-ADMISSION MEDICATION HISTORY
Admission medication history interview status for this patient is complete. See [facility] admission navigator for allergy information, prior to admission medications and immunization status.     Medication history interview done, indicate source(s): Patient and [phone]  Medication history resources (including written lists, pill bottles, clinic record):None    Changes made to PTA medication list:  Added: none  Changed: FeSO4  Reported as Not Taking: none  Removed: none    Actions taken by pharmacist (provider contacted, etc):None     Additional medication history information:None    Medication reconciliation/reorder completed by provider prior to medication history?  n   (Y/N)       Medication Affordability:        For patients on insulin therapy:   Do you use sliding scale insulin based on blood sugars?   What is your pre-meal insulin coverage?    Do you typically eat three meals a day?   How many times do you check your blood glucose per day?   How many episodes of hypoglycemia do you typically have per month?   Do you have a Continuous Glucose Monitor (CGM)?      Prior to Admission medications    Medication Sig Last Dose Taking? Auth Provider Long Term End Date   acetaminophen (TYLENOL) 500 MG tablet Take 1,000 mg by mouth 3 times daily as needed   Yes Unknown, Entered By History     amLODIPine (NORVASC) 10 MG tablet Take 10 mg by mouth At Bedtime 1/25/2023 Yes Unknown, Entered By History Yes    aspirin (ASA) 325 MG EC tablet Take 325 mg by mouth daily 1/25/2023 Yes Unknown, Entered By History     cyclobenzaprine (FLEXERIL) 5 MG tablet Take 1 tablet (5 mg) by mouth 3 times daily as needed for muscle spasms  Yes Wellington Huynh MD     ezetimibe (ZETIA) 10 MG tablet Take 10 mg by mouth daily 1/25/2023 Yes Unknown, Entered By History Yes    ferrous sulfate (FE TABS) 325 (65 Fe) MG EC tablet Take 325 mg by mouth three times a week 1/25/2023 Yes Unknown, Entered By History     gabapentin (NEURONTIN) 300 MG capsule  Take 900 mg by mouth At Bedtime  1/25/2023 Yes Unknown, Entered By History Yes    meclizine (ANTIVERT) 25 MG tablet Take 1 tablet (25 mg) by mouth 3 times daily as needed for dizziness  Yes Wellington Huynh MD     metoclopramide (REGLAN) 10 MG tablet Take 1 tablet (10 mg) by mouth 4 times daily as needed (nausea, headache)  Yes Nurys Martin MD     multivitamin w/minerals (THERA-VIT-M) tablet Take 1 tablet by mouth daily 1/25/2023 Yes Unknown, Entered By History     omeprazole (PRILOSEC) 20 MG DR capsule Take 20 mg by mouth daily 1/25/2023 Yes Unknown, Entered By History

## 2023-01-26 NOTE — ED NOTES
[facility]  ED Nurse Handoff Report    Edgardo Rice is a 88 year old male   ED Chief complaint: Nausea, Vomiting, & Diarrhea  . ED Diagnosis:   Final diagnoses:   Intractable vomiting   Diarrhea, unspecified type     Allergies:   Allergies   Allergen Reactions     Atorvastatin      Cholestyramine      Diphenhydramine      Droperidol      Morphine      Norco [Hydrocodone-Acetaminophen]      Sulfa Drugs      Tramadol      Tylenol With Codeine [Acetaminophen-Codeine]        Code Status: Full Code  Activity level - Baseline/Home:  Independent. Activity Level - Current:   Assist X 1. Lift room needed: No. Bariatric: No   Needed: No   Isolation: No. Infection: Not Applicable.     Vital Signs:   Vitals:    01/26/23 0713 01/26/23 0715   BP:  (!) 148/67   Pulse: 71    Resp: 20    Temp: 97  F (36.1  C)    TempSrc: Temporal    SpO2: 92%        Cardiac Rhythm:  ,      Pain level:    Patient confused: No. Patient Falls Risk: Yes.   Elimination Status: Has voided   Patient Report - Initial Complaint: Nausea, Vomiting, & Diarrhea.   Focused Assessment: HPI   Edgardo Rice is a 88 year old male with a history of hypertension, CVA who presents for nausea, vomiting, and diarrhea. Symptoms have been present for two days. Patient states his wife is in a nursing facility and he visited her three days ago. She now has similar symptoms. Patient denies fevers. He states he has been consistently vomiting for two days and has poor oral intake. He endorses soft stool but no blood in stool. He denies abdominal pain or urinary complaints. No recent falls or head injury. EMS provided patient with 5 mg Zofran en route.      Tests Performed: labs, imaging. Abnormal Results:   Labs Ordered and Resulted from Time of ED Arrival to Time of ED Departure   BASIC METABOLIC PANEL - Abnormal       Result Value    Sodium 134 (*)     Potassium 3.8      Chloride 98      Carbon Dioxide (CO2) 26      Anion Gap 10      Urea Nitrogen  15.6      Creatinine 0.76      Calcium 9.2      Glucose 144 (*)     GFR Estimate 87     CBC WITH PLATELETS - Normal    WBC Count 6.8      RBC Count 4.53      Hemoglobin 14.9      Hematocrit 43.0      MCV 95      MCH 32.9      MCHC 34.7      RDW 12.4      Platelet Count 206     INFLUENZA A/B & SARS-COV2 PCR MULTIPLEX - Normal    Influenza A PCR Negative      Influenza B PCR Negative      RSV PCR Negative      SARS CoV2 PCR Negative       CT Abdomen Pelvis w Contrast   Preliminary Result   IMPRESSION:    1.  No acute abnormality in the abdomen or pelvis. No cause for   abdominal pain is identified.   2.  Colonic diverticulosis, without evidence for diverticulitis.        .   Treatments provided: See MAR  Family Comments: NA  OBS brochure/video discussed/provided to patient:  Yes  ED Medications:   Medications   ondansetron (ZOFRAN) injection 4 mg (4 mg Intravenous Given 1/26/23 1015)   0.9% sodium chloride BOLUS (0 mLs Intravenous Stopped 1/26/23 1354)   metoclopramide (REGLAN) injection 10 mg (10 mg Intravenous Given 1/26/23 1354)   iopamidol (ISOVUE-370) solution 500 mL (75 mLs Intravenous Given 1/26/23 1436)   CT Scan Flush (55 mLs Intravenous Given 1/26/23 1436)   ondansetron (ZOFRAN) injection 4 mg (4 mg Intravenous Given 1/26/23 1454)   OLANZapine (zyPREXA) IV injection 1.25 mg (1.25 mg Intravenous Given 1/26/23 1533)   lactated ringers BOLUS 1,000 mL (1,000 mLs Intravenous New Bag 1/26/23 1533)     Drips infusing:  No  For the majority of the shift, the patient's behavior Green. Interventions performed were NA.    Sepsis treatment initiated: No     Patient tested for COVID 19 prior to admission: YES    ED Nurse Name/Phone Number: Holli Wells RN,   3:50 PM    RECEIVING UNIT ED HANDOFF REVIEW    Above ED Nurse Handoff Report was reviewed: YES  Reviewed by: Grazyna Acosta, RN on January 26, 2023 at 5:58 PM

## 2023-01-26 NOTE — ED PROVIDER NOTES
History     Chief Complaint:  Nausea, Vomiting, & Diarrhea       HPI   Edgardo Rice is a 88 year old male with a history of hypertension, CVA who presents for nausea, vomiting, and diarrhea. Symptoms have been present for two days. Patient states his wife is in a nursing facility and he visited her three days ago. She now has similar symptoms. Patient denies fevers. He states he has been consistently vomiting for two days and has poor oral intake. He endorses soft stool but no blood in stool. He denies abdominal pain or urinary complaints. No recent falls or head injury. EMS provided patient with 8 mg Zofran en route.    Independent Historian:   Patient    Review of External Notes:      ROS:  Review of Systems   Gastrointestinal: Positive for diarrhea, nausea and vomiting. Negative for abdominal pain and blood in stool.   All other systems reviewed and are negative.    Allergies:  Droperidol  Morphine  Cholestyramine  Sulfa  Tramadol  Tylenol with codeine  Norco     Medications:    Meclizine  Atorvastatin  Ferrous sulfate  Gabapentin  Amlodipine    Past Medical History:    CVA  Vitamin B12 deficiency  Macular degeneration  SCC of buccal mucosa  Retinal emboli, left  Spinal stenosis of lumbar region  Melanoma of skin  BPPV  Insomnia  Diverticulitis  GERD  Hypertension   Hypercholesterolemia  Iron deficiency anemia  CAD  Tobacco abuse  Osteoarthrosis  Congenital hiatus hernia    Past Surgical History:    Knee arthroscopy  Hemorrhoidectomy  Hernia repair x2  Vasectomy  Carotid edardectomy   Cholecystectomy   Tongue biopsy  Mohs head, neck, hands  EGD  Bilateral cataract extraction with IOL  Ventral hernia  repair      Family History:    Prostate cancer - brothers, father  Heart disease - mother  Hyperlipidemia - mother  Hypertension - mother  Melanoma - mother    Social History:  Patient arrived via EMS.  Patient is accompanied in the ED.  PCP: Noé, Bibiana Stallings     Physical Exam     Patient Vitals for the  past 24 hrs:   BP Temp Temp src Pulse Resp SpO2   01/26/23 0715 (!) 148/67 -- -- -- -- --   01/26/23 0713 -- 97  F (36.1  C) Temporal 71 20 92 %        Physical Exam  Constitutional: Alert, attentive, GCS 15  HENT:    Nose: Nose normal.    Mouth/Throat: Oropharynx is clear, mucous membranes are moist  Eyes:  EOM are normal.    CV:  regular rate and rhythm; no murmurs, rubs or gallups  Chest: Effort normal and breath sounds normal.   GI:   Epigastrium - no tenderness, no guarding   RUQ - no tenderness or Magana's sign   RLQ - No tenderness, no guarding, no Rovsing's sign   Suprapubic area - no tenderness, no guarding    LLQ - no tenderness, no guarding   LUQ - no tenderness, no guarding   No distension. Normal bowel sounds  MSK: Normal range of motion.   Neurological: Alert, attentive  Skin: Skin is warm and dry.      Emergency Department Course     Imaging:  CT Abdomen Pelvis w Contrast   Preliminary Result   IMPRESSION:    1.  No acute abnormality in the abdomen or pelvis. No cause for   abdominal pain is identified.   2.  Colonic diverticulosis, without evidence for diverticulitis.         Report per radiology    Laboratory:  Labs Ordered and Resulted from Time of ED Arrival to Time of ED Departure   BASIC METABOLIC PANEL - Abnormal       Result Value    Sodium 134 (*)     Potassium 3.8      Chloride 98      Carbon Dioxide (CO2) 26      Anion Gap 10      Urea Nitrogen 15.6      Creatinine 0.76      Calcium 9.2      Glucose 144 (*)     GFR Estimate 87     CBC WITH PLATELETS - Normal    WBC Count 6.8      RBC Count 4.53      Hemoglobin 14.9      Hematocrit 43.0      MCV 95      MCH 32.9      MCHC 34.7      RDW 12.4      Platelet Count 206     INFLUENZA A/B & SARS-COV2 PCR MULTIPLEX - Normal    Influenza A PCR Negative      Influenza B PCR Negative      RSV PCR Negative      SARS CoV2 PCR Negative          Emergency Department Course & Assessments:    Interventions:  Medications   ondansetron (ZOFRAN) injection 4 mg  (4 mg Intravenous Given 1/26/23 1015)   0.9% sodium chloride BOLUS (0 mLs Intravenous Stopped 1/26/23 1354)   metoclopramide (REGLAN) injection 10 mg (10 mg Intravenous Given 1/26/23 1354)   iopamidol (ISOVUE-370) solution 500 mL (75 mLs Intravenous Given 1/26/23 1436)   CT Scan Flush (55 mLs Intravenous Given 1/26/23 1436)   ondansetron (ZOFRAN) injection 4 mg (4 mg Intravenous Given 1/26/23 1454)   OLANZapine (zyPREXA) IV injection 1.25 mg (1.25 mg Intravenous Given 1/26/23 1533)   lactated ringers BOLUS 1,000 mL (1,000 mLs Intravenous New Bag 1/26/23 1533)      Independent Interpretation (X-rays, CTs, rhythm strip):  None    Consultations/Discussion of Management or Tests:  1330  Dr. Manjarrez evaluated the patient.   1513 Dr. Manjarrez evaluated the patient a second time. He agrees with the plan of care.   1533 I consulted with Dawn Callejas accepting for Dr. Huynh of the hospitalist service and discussed patient admission. She accepted care of the patient.    Social Determinants of Health affecting care:  None     Assessments:  1325  I obtained patient history and physical examination as above.  1508 I rechecked and updated the patient.     Disposition:  The patient was admitted to the hospital under the care of Dr. Huynh.    Impression & Plan    CMS Diagnoses: None    Medical Decision Making:  Patient is a non-toxic appearing 87 yo M who presents with two day history of nausea, vomiting, and diarrhea. Patient's wife is sick with similar symptoms. He is in no acute distress. Repeated vomiting witnessed in ED. Vitals appropriate. Afebrile without tachycardia. Physical examination reveals repeated retching but no focal abdominal tenderness or rigidity. Differential includes viral gastroenteritis, small bowel obstruction, appendicitis.  Preliminary labs obtained and are reassuring.  Electrolytes are within normal limits. Covid19/influenza test negative. Patient continues to vomit.  Repeated doses of Zofran and single dose  of Reglan provided.  IV NS fluids given.  Considering patient continues to vomit but has reassuring labs, imaging is considered.  CT abdomen pelvis ordered and shows no acute pathology in the abdomen.   Results reviewed with patient. He continues to vomit. Patient care discussed with Dr. Manjarrez. He recommends bolus of LR and IV zyprexa. These are ordered.  Considering patient's symptoms are not well-controlled, he is candidate for inpatient observation. I spoke with Dawn Callejas who accepted patient for observation. Patient continues to vomit but is vitally stable.    Diagnosis:    ICD-10-CM    1. Intractable vomiting  R11.10       2. Diarrhea, unspecified type  R19.7            Discharge Medications:  New Prescriptions    No medications on file        Scribe Disclosure:  I, Sydney Mcknight, am serving as a scribe at 3:13 PM on 1/26/2023 to document services personally performed by Danelle Landin PA-C based on my observations and the provider's statements to me.    1/26/2023   Danelle Landin PA-C Steinbrueck, Emily, PA-C  01/26/23 2898

## 2023-01-26 NOTE — H&P
[facility]    Hospitalist History and Physical    Name: Edgardo Rice    MRN: [id number]  YOB: 1935    Age: 88 year old  Date of Admission:  1/26/2023  Date of Service (when I saw the patient): 01/26/23    Assessment & Plan   Edgardo Rice is a 88 year old male BPPV, reflux esophagitis, HLP, hypertension, BPH, CAD, CVA (6/2020), and chronic headache who presents to the ED on 1/26/2023 for evaluation of nausea, vomiting, and diarrhea for the last 2 days.    ED work-up reveals: mildly hypertensive otherwise VSS, CBC unremarkable, sodium of 134, glucose of 144, influenza A/B, RSV, and COVID negative, CT of abdomen/pelvis shows no acute abnormality or cause for abdominal pain.    #Intractable nausea, vomiting, and diarrhea, suspect viral gastroenteritis: onset 3 days ago of nausea with multiple episodes of nonbloody emesis and 3-4 episodes of nonbloody diarrhea per day. Unable to keep any oral intake down including water. No reported fevers and afebrile. Known sick contact is wife who resides in nursing facility where there has been a recent gastroenteritis outbreak. No leukocytosis and CT of abdomen/pelvis negative for acute pathology. Likely viral gastroenteritis. Symptoms improving with IVFs and antiemetics in ED, but not tolerating adequate PO in order to go home.   - IVF hydration with LR at 100 ml/hour  - ADAT  - PRN antiemetics  - check enteric panel, no current indication for c diff testing  - check orthostatic blood pressures due to reporting dizziness   - ambulate with nursing staff, usually independent with a walker, if below baseline then consider PT assessment in AM    #Mild hyponatremia: initial sodium of 134, likely due to dehydration from GI losses  - IVF hydration with LR at 100 ml/hour  - follow BMP    #H/o CVA: diagnosed in 06/2020 with right occipital and parietal lobes, no residual deficits.  Initially treated with aspirin and Plavix. Now on daily aspirin,  continue.    #HLP: continue PTA statin     #HTN: BP initially slightly elevated   - resume PTA amlodipine with parameters, if orthostatics positive then hold     #Reflux esophagitis: IV Protonix until PO well tolerated     Clinically Significant Risk Factors Present on Admission                             DVT Prophylaxis: Low Risk/Ambulatory with no VTE prophylaxis indicated  Code Status: DNR / DNI, confirmed with patient  Disposition: Expected discharge in 24-48 hours, will admit to observation     Primary Care Physician   [facility]    Chief Complaint   Nausea, vomiting, and diarrhea     History obtained per discussion with ED provider, Danelle Landin PA-C, chart review, and interview with patient.     History of Present Illness   Edgardo Rice is a 88 year old male who presents with nausea, vomiting, and diarrhea for the last 3 days.  He states that he has been constantly nauseous since his symptoms started with multiple episodes of nonbloody emesis.  He has only attempted to keep down soda crackers and water, which has not worked.  Denies associated abdominal pain.  He has had 3-4 episodes of nonbloody diarrhea the last couple days.  He states that his stools are usually dark due to taking iron supplements. He notes associated lightheadedness, dizziness, generalized headache, and dyspepsia.  Denies chest pain or shortness of breath.  He reports he has been urinating normal amounts without dysuria.  Patient's wife resides in a nursing facility and he visited her on Monday where she was not feeling well.  He states that there was an outbreak of a GI bug at her facility.  The patient's wife's symptoms lasted a couple days before resolving on its own. He has been taking his home medications since being ill except for today.  He is not tried anything over-the-counter for symptoms. Denies any recent antibiotic use. He states his symptoms have improved with interventions in the ER.  He last vomited  and had diarrhea about midday today.    Past Medical History    Past Medical History:   Diagnosis Date     BPH (benign prostatic hyperplasia)      BPPV (benign paroxysmal positional vertigo)      CAD (coronary artery disease)      CVA (cerebral vascular accident) (H)      Diverticulitis      HLD (hyperlipidemia)      Hypertension      Insomnia      Macular degeneration      Reflux esophagitis      Past Surgical History   Past Surgical History:   Procedure Laterality Date     ARTHROSCOPY KNEE Right      CAROTID ENDARTERECTOMY       CHOLECYSTECTOMY       HEMORRHOIDECTOMY       HERNIA REPAIR       HERNIA REPAIR       KERATOPLASTY PENETRATING, VITRECTOMY ANTERIOR, EXTRACAPSULAR CATARACT EXTRACTION, COMBINED Bilateral     W/ intraocular lens implant     Prior to Admission Medications   Prior to Admission Medications   Prescriptions Last Dose Informant Patient Reported? Taking?   acetaminophen (TYLENOL) 500 MG tablet  Self Yes No   Sig: Take 1,000 mg by mouth 3 times daily as needed    amLODIPine (NORVASC) 10 MG tablet  Self Yes No   Sig: Take 10 mg by mouth At Bedtime   aspirin (ASA) 325 MG EC tablet   Yes No   Sig: Take 325 mg by mouth daily   cyclobenzaprine (FLEXERIL) 5 MG tablet   No No   Sig: Take 1 tablet (5 mg) by mouth 3 times daily as needed for muscle spasms   ezetimibe (ZETIA) 10 MG tablet   Yes No   Sig: Take 10 mg by mouth daily   ferrous sulfate (FE TABS) 325 (65 Fe) MG EC tablet   Yes No   Sig: Take 325 mg by mouth 2 times daily (with meals)   gabapentin (NEURONTIN) 300 MG capsule  Self Yes No   Sig: Take 900 mg by mouth At Bedtime    meclizine (ANTIVERT) 25 MG tablet   No No   Sig: Take 1 tablet (25 mg) by mouth 3 times daily as needed for dizziness   metoclopramide (REGLAN) 10 MG tablet   No No   Sig: Take 1 tablet (10 mg) by mouth 4 times daily as needed (nausea, headache)   multivitamin w/minerals (THERA-VIT-M) tablet  Self Yes No   Sig: Take 1 tablet by mouth daily   omeprazole (PRILOSEC) 20 MG   capsule  Self Yes No   Sig: Take 20 mg by mouth daily      Facility-Administered Medications: None     Allergies   Allergies   Allergen Reactions     Atorvastatin      Cholestyramine      Diphenhydramine      Droperidol      Morphine      Norco [Hydrocodone-Acetaminophen]      Sulfa Drugs      Tramadol      Tylenol With Codeine [Acetaminophen-Codeine]        Social History   Social History     Tobacco Use     Smoking status: Never     Smokeless tobacco: Never   Substance Use Topics     Alcohol use: Not Currently     Social History     Social History Narrative     Not on file     Family History   Family history reviewed with patient and is noncontributory.    Review of Systems   A Comprehensive greater than 10 system review of systems was carried out.  Pertinent positives and negatives are noted above.  Otherwise negative for contributory information.    Physical Exam   Temp: 97  F (36.1  C) Temp src: Temporal BP: (!) 148/67 Pulse: 71   Resp: 20 SpO2: 92 % O2 Device: None (Room air)    Vital Signs with Ranges  Temp:  [97  F (36.1  C)] 97  F (36.1  C)  Pulse:  [71] 71  Resp:  [20] 20  BP: (148)/(67) 148/67  SpO2:  [92 %] 92 %  0 lbs 0 oz    GEN:  Alert, oriented x 3, appears comfortable, no overt distress, sitting on edges of [facility]  HEENT:  Normocephalic/atraumatic, no scleral icterus, no nasal discharge, mucus membranes appear dry  CV:  Regular rate and rhythm, no murmur or JVD.  S1 + S2 noted, no S3 or S4.  LUNGS:  Clear to auscultation bilaterally without rales/rhonchi/wheezing/retractions.  Symmetric chest rise on inhalation noted.  ABD:  Active bowel sounds, soft, non-tender/non-distended.  No rebound/guarding/rigidity.  EXT:  No edema.  No cyanosis.  No acute joint synovitis noted.  SKIN:  Dry to touch, no exanthems noted in the visualized areas.  NEURO:  Symmetric muscle strength, sensation to touch grossly intact.  Coordination symmetric on general exam.  No new focal deficits appreciated.    Data   Data  reviewed today:  I personally reviewed all labs and imaging for this visit.    Results for orders placed or performed during the hospital encounter of 01/26/23   CT Abdomen Pelvis w Contrast     Status: None (Preliminary result)    Narrative    CT ABDOMEN AND PELVIS WITH CONTRAST 1/26/2023 2:48 PM    CLINICAL HISTORY: Nausea and vomiting. Diarrhea.  TECHNIQUE: CT scan of the abdomen and pelvis was performed following  injection of IV contrast. Multiplanar reformats were obtained. Dose  reduction techniques were used.  CONTRAST: 75mL Isovue-370  COMPARISON: None.    FINDINGS:   LOWER CHEST: Mild scarring and/or atelectasis at both lung bases.  Small hiatal hernia.    HEPATOBILIARY: Cholecystectomy. Scattered small hepatic cysts would  require no specific follow-up. A few tiny hypodensities in the liver  are too small to characterize, but may also represent cysts. Mild  intra and extrahepatic biliary dilatation may be related to the  patient's age and postcholecystectomy state.    PANCREAS: Normal.    SPLEEN: Normal.    ADRENAL GLANDS: Normal.    KIDNEYS/BLADDER: Unremarkable. No hydronephrosis.    BOWEL: No bowel obstruction. Colonic diverticulosis. No convincing  evidence for colitis or diverticulitis. Unremarkable appendix.    PELVIC ORGANS: Mild prostatic enlargement.    LYMPH NODES: No enlarged lymph nodes are identified in the abdomen or  pelvis.    VASCULATURE: Moderate atherosclerotic aortoiliac calcification.  Retroaortic left renal vein.    ADDITIONAL FINDINGS: None.    MUSCULOSKELETAL: Degenerative changes in the visualized thoracolumbar  spine. Intramuscular lipoma in the abdominal wall on the right  laterally measures 5.4 cm.      Impression    IMPRESSION:   1.  No acute abnormality in the abdomen or pelvis. No cause for  abdominal pain is identified.  2.  Colonic diverticulosis, without evidence for diverticulitis.   CBC (platelets, no diff)     Status: Normal   Result Value Ref Range    WBC Count 6.8  4.0 - 11.0 10e3/uL    RBC Count 4.53 4.40 - 5.90 10e6/uL    Hemoglobin 14.9 13.3 - 17.7 g/dL    Hematocrit 43.0 40.0 - 53.0 %    MCV 95 78 - 100 fL    MCH 32.9 26.5 - 33.0 pg    MCHC 34.7 31.5 - 36.5 g/dL    RDW 12.4 10.0 - 15.0 %    Platelet Count 206 150 - 450 10e3/uL   Basic metabolic panel (BMP)     Status: Abnormal   Result Value Ref Range    Sodium 134 (L) 136 - 145 mmol/L    Potassium 3.8 3.4 - 5.3 mmol/L    Chloride 98 98 - 107 mmol/L    Carbon Dioxide (CO2) 26 22 - 29 mmol/L    Anion Gap 10 7 - 15 mmol/L    Urea Nitrogen 15.6 8.0 - 23.0 mg/dL    Creatinine 0.76 0.67 - 1.17 mg/dL    Calcium 9.2 8.8 - 10.2 mg/dL    Glucose 144 (H) 70 - 99 mg/dL    GFR Estimate 87 >60 mL/min/1.73m2   Extra Tube ([place] Draw)     Status: None    Narrative    The following orders were created for panel order Extra Tube ([place] Draw).  Procedure                               Abnormality         Status                     ---------                               -----------         ------                     Extra Blue Top Tube[[id number]]                              Final result               Extra Red Top Tube[[id number]]                               Final result               Extra Green Top (Lithium...[[id number]]                      Final result                 Please view results for these tests on the individual orders.   Extra Blue Top Tube     Status: None   Result Value Ref Range    Hold Specimen JIC    Extra Red Top Tube     Status: None   Result Value Ref Range    Hold Specimen JIC    Extra Green Top (Lithium Heparin) ON ICE     Status: None   Result Value Ref Range    Hold Specimen JIC    Symptomatic Influenza A/B & SARS-CoV2 (COVID-19) Virus PCR Multiplex Nasopharyngeal     Status: Normal    Specimen: Nasopharyngeal; Swab   Result Value Ref Range    Influenza A PCR Negative Negative    Influenza B PCR Negative Negative    RSV PCR Negative Negative    SARS CoV2 PCR Negative Negative    Narrative    Testing was  performed using the Xpert Xpress CoV2/Flu/RSV Assay on the [employer] GeneXpert Instrument. This test should be ordered for the detection of SARS-CoV-2 and influenza viruses in individuals who meet clinical and/or epidemiological criteria. Test performance is unknown in asymptomatic patients. This test is for in vitro diagnostic use under the FDA EUA for laboratories certified under CLIA to perform high or moderate complexity testing. This test has not been FDA cleared or approved. A negative result does not rule out the presence of PCR inhibitors in the specimen or target RNA in concentration below the limit of detection for the assay. If only one viral target is positive but coinfection with multiple targets is suspected, the sample should be re-tested with another FDA cleared, approved, or authorized test, if coinfection would change clinical management. This test was validated by the [facility]. These laboratories are certified under the Clinical Laboratory Improvement Amendments of 1988 (CLIA-88) as qualified to perform high complexity laboratory testing.     Jennifer Callejas PA-C  [facility]  Securely message with the Vocera Web Console (learn more here)  Text page via Etaphase Paging/Directory

## 2023-01-26 NOTE — ED TRIAGE NOTES
Pt BIBA with N/V/D for 2 days. Pt's wife lives in a SNF and has a stomach virus, pt now has similar sx. Pt denies pain or blood. EMS gave 2x4mg IV zofran with minimal relief. , VSS.

## 2023-01-27 VITALS
TEMPERATURE: 98.6 F | WEIGHT: 151.5 LBS | SYSTOLIC BLOOD PRESSURE: 118 MMHG | DIASTOLIC BLOOD PRESSURE: 48 MMHG | HEIGHT: 66 IN | RESPIRATION RATE: 18 BRPM | BODY MASS INDEX: 24.35 KG/M2 | HEART RATE: 55 BPM | OXYGEN SATURATION: 92 %

## 2023-01-27 LAB
C COLI+JEJUNI+LARI FUSA STL QL NAA+PROBE: NOT DETECTED
EC STX1 GENE STL QL NAA+PROBE: NOT DETECTED
EC STX2 GENE STL QL NAA+PROBE: NOT DETECTED
NOROV GI+II ORF1-ORF2 JNC STL QL NAA+PR: DETECTED
RVA NSP5 STL QL NAA+PROBE: NOT DETECTED
SALMONELLA SP RPOD STL QL NAA+PROBE: NOT DETECTED
SHIGELLA SP+EIEC IPAH STL QL NAA+PROBE: NOT DETECTED
V CHOL+PARA RFBL+TRKH+TNAA STL QL NAA+PR: NOT DETECTED
Y ENTERO RECN STL QL NAA+PROBE: NOT DETECTED

## 2023-01-27 PROCEDURE — 250N000013 HC RX MED GY IP 250 OP 250 PS 637: Performed by: PHYSICIAN ASSISTANT

## 2023-01-27 PROCEDURE — 87506 IADNA-DNA/RNA PROBE TQ 6-11: CPT | Performed by: PHYSICIAN ASSISTANT

## 2023-01-27 PROCEDURE — 250N000011 HC RX IP 250 OP 636: Performed by: PHYSICIAN ASSISTANT

## 2023-01-27 PROCEDURE — 96361 HYDRATE IV INFUSION ADD-ON: CPT

## 2023-01-27 PROCEDURE — 250N000013 HC RX MED GY IP 250 OP 250 PS 637: Performed by: INTERNAL MEDICINE

## 2023-01-27 PROCEDURE — 99239 HOSP IP/OBS DSCHRG MGMT >30: CPT | Performed by: HOSPITALIST

## 2023-01-27 PROCEDURE — 258N000003 HC RX IP 258 OP 636: Performed by: PHYSICIAN ASSISTANT

## 2023-01-27 PROCEDURE — C9113 INJ PANTOPRAZOLE SODIUM, VIA: HCPCS | Performed by: PHYSICIAN ASSISTANT

## 2023-01-27 PROCEDURE — 96375 TX/PRO/DX INJ NEW DRUG ADDON: CPT

## 2023-01-27 PROCEDURE — G0378 HOSPITAL OBSERVATION PER HR: HCPCS

## 2023-01-27 RX ADMIN — PANTOPRAZOLE SODIUM 40 MG: 40 INJECTION, POWDER, FOR SOLUTION INTRAVENOUS at 09:01

## 2023-01-27 RX ADMIN — Medication 1 MG: at 01:07

## 2023-01-27 RX ADMIN — SODIUM CHLORIDE, POTASSIUM CHLORIDE, SODIUM LACTATE AND CALCIUM CHLORIDE: 600; 310; 30; 20 INJECTION, SOLUTION INTRAVENOUS at 03:28

## 2023-01-27 RX ADMIN — ASPIRIN 325 MG: 325 TABLET ORAL at 09:00

## 2023-01-27 ASSESSMENT — ACTIVITIES OF DAILY LIVING (ADL)
ADLS_ACUITY_SCORE: 26
ADLS_ACUITY_SCORE: 24

## 2023-01-27 NOTE — PLAN OF CARE
Patient's After Visit Summary was reviewed with patient     Patient verbalized understanding of After Visit Summary, recommended follow up and was given an opportunity to ask questions.     Discharge medications sent home with patient/family: No    Discharged with friend

## 2023-01-27 NOTE — PLAN OF CARE
PRIMARY DIAGNOSIS: Gastroenteritis    OUTPATIENT/OBSERVATION GOALS TO BE MET BEFORE DISCHARGE  1. Orthostatic performed: No    2. Tolerating PO fluid and/or antibiotics (if applicable): Yes, Sips of water    3. Nausea/Vomiting/Diarrhea symptoms improved: Yes    4. Pain status: Improved-controlled with oral pain medications.    5. Return to near baseline physical activity: Yes    Discharge Planner Nurse   Safe discharge environment identified: Yes  Barriers to discharge: Yes       Entered by: Gretchen Barrios RN 01/26/2023 6:15 PM

## 2023-01-27 NOTE — PLAN OF CARE
PRIMARY DIAGNOSIS: GASTROENTERITIS    OUTPATIENT/OBSERVATION GOALS TO BE MET BEFORE DISCHARGE  1. Orthostatic performed: Yes:                    Sitting Orthostatic BP: 156/64         Standing Orthostatic BP: 157/63     2. Tolerating PO fluid and/or antibiotics (if applicable): Yes    3. Nausea/Vomiting/Diarrhea symptoms improved: Yes    4. Pain status: Pain free.    5. Return to near baseline physical activity: Yes    Discharge Planner Nurse   Safe discharge environment identified: Yes  Barriers to discharge: Yes       Entered by: Clemencia Jennings RN 01/27/2023      Please review provider order for any additional goals.   Nurse to notify provider when observation goals have been met and patient is ready for discharge.

## 2023-01-27 NOTE — PLAN OF CARE
"PRIMARY DIAGNOSIS: GASTROENTERITIS    OUTPATIENT/OBSERVATION GOALS TO BE MET BEFORE DISCHARGE  1. Orthostatic performed: Will complete this evening    2. Tolerating PO fluid and/or antibiotics (if applicable): Yes    3. Nausea/Vomiting/Diarrhea symptoms improved: YES    4. Pain status: DENIES    5. Return to near baseline physical activity: YES    Discharge Planner Nurse   Safe discharge environment identified: Lives alone  Barriers to discharge: Not once medically cleared       Entered by: Grazyna Acosta RN 01/26/2023 7:40 PM     BP (!) 166/72 (BP Location: Right arm)   Pulse 75   Temp 98.7  F (37.1  C) (Oral)   Resp 16   Ht 1.676 m (5' 6\")   Wt 71 kg (156 lb 9.6 oz)   SpO2 94%   BMI 25.28 kg/m      Vitals stable. Pt alert and oriented x4. SBA. Bed alarm on for increased safety. Pt reports feeling \"so much better\". Mild nausea but tolerating broth, soda, coffee, and jello. No BM since admission. Still need to collect stool. IVF infusing.  frequent congested cough, pt reports this is a new cough. Pt resting comfortably. Will continue to provide supportive cares    Please review provider order for any additional goals.   Nurse to notify provider when observation goals have been met and patient is ready for discharge.  "

## 2023-01-27 NOTE — PLAN OF CARE
PRIMARY DIAGNOSIS: N+V AND DIARRHEA    OUTPATIENT/OBSERVATION GOALS TO BE MET BEFORE DISCHARGE  1. Orthostatic performed: Yes:                    Sitting Orthostatic BP: 156/64         Standing Orthostatic BP: 157/63     2. Tolerating PO fluid and/or antibiotics (if applicable): Yes    3. Nausea/Vomiting/Diarrhea symptoms improved: Yes    4. Pain status: Pain free.    5. Return to near baseline physical activity: Yes    Discharge Planner Nurse   Safe discharge environment identified: Yes  Barriers to discharge: Yes       Entered by: Juanita Saenz RN 01/27/2023 12:56 PM     Please review provider order for any additional goals. Nurse to notify provider when observation goals have been met and patient is ready for discharge.    Enteric precautions maintained. Alert and oriented. Up independently. Left IV site saline locked. Stool sample sent this morning.

## 2023-01-27 NOTE — PLAN OF CARE
PRIMARY DIAGNOSIS: GASTROENTERITIS    OUTPATIENT/OBSERVATION GOALS TO BE MET BEFORE DISCHARGE  1. Orthostatic performed: Yes:                   Sitting Orthostatic BP: 156/64         Standing Orthostatic BP: 157/63     2. Tolerating PO fluid and/or antibiotics (if applicable): Yes    3. Nausea/Vomiting/Diarrhea symptoms improved: Yes    4. Pain status: Pain free.    5. Return to near baseline physical activity: Yes    A&Ox4, VSS on RA. Denies pain or nausea. No BM since 1/26 AM in ED per pt report, awaiting sample for enteric panel testing. Enteric precautions maintained. Up SBA w/ IV pole, denies LH/dizziness. IVF infusing, up frequently to urinate d/t this. Frequent cough. LS diminished.    Discharge Planner Nurse   Safe discharge environment identified: Yes  Barriers to discharge: Yes       Entered by: Clemencia Jennings RN 01/27/2023      Please review provider order for any additional goals.   Nurse to notify provider when observation goals have been met and patient is ready for discharge.

## 2023-01-27 NOTE — PLAN OF CARE
PRIMARY DIAGNOSIS: N+V AND DIARRHEA    OUTPATIENT/OBSERVATION GOALS TO BE MET BEFORE DISCHARGE  1. Orthostatic performed: Yes:                    Sitting Orthostatic BP: 156/64         Standing Orthostatic BP: 157/63     2. Tolerating PO fluid and/or antibiotics (if applicable): Yes    3. Nausea/Vomiting/Diarrhea symptoms improved: Yes    4. Pain status: Pain free.    5. Return to near baseline physical activity: Yes    Discharge Planner Nurse   Safe discharge environment identified: Yes  Barriers to discharge: Yes       Entered by: Juanita Saenz RN 01/27/2023 9:52 AM     Please review provider order for any additional goals. Nurse to notify provider when observation goals have been met and patient is ready for discharge.    Enteric precautions maintained. Alert and oriented. Up independently. Left IV site saline locked. Stool sample sent this morning. Denies pain, numbness, and tingling. Reports new cough, provider made aware.

## 2023-01-27 NOTE — DISCHARGE SUMMARY
[facility]  Hospitalist Discharge Summary      Date of Admission:  1/26/2023  Date of Discharge:  1/27/2023  Discharging Provider: Wellington Huynh MD  Discharge Service: Hospitalist Service    Discharge Diagnoses   Nausea, vomiting, diarrhea. Likely viral gastroenteritis. Mild hypontremia    Follow-ups Needed After Discharge   Follow-up Appointments     Follow-up and recommended labs and tests       Follow up with primary care provider, Bibiana Umanzor, within 7   days for hospital follow- up.  No follow up labs or test are needed.           Unresulted Labs Ordered in the Past 30 Days of this Admission     Date and Time Order Name Status Description    1/26/2023  5:25 PM Enteric Bacteria and Virus Panel by MI Stool In process       These results will be followed up by hospitalist group    Discharge Disposition   Discharged to home  Condition at discharge: Stable    Hospital Course   Edgardo Rice is a 88 year old male who presents with nausea, vomiting, and diarrhea for the last 3 days.  He states that he has been constantly nauseous since his symptoms started with multiple episodes of nonbloody emesis.  He has only attempted to keep down soda crackers and water, which has not worked.  Denies associated abdominal pain.  He has had 3-4 episodes of nonbloody diarrhea the last couple days.  He states that his stools are usually dark due to taking iron supplements. He notes associated lightheadedness, dizziness, generalized headache, and dyspepsia.  Denies chest pain or shortness of breath.  He reports he has been urinating normal amounts without dysuria.  Patient's wife resides in a nursing facility and he visited her on Monday where she was not feeling well.  He states that there was an outbreak of a GI bug at her facility.  The patient's wife's symptoms lasted a couple days before resolving on its own. He has been taking his home medications since being ill except for today.  He is  not tried anything over-the-counter for symptoms. Denies any recent antibiotic use. He states his symptoms have improved with interventions in the ER.  He last vomited and had diarrhea about midday today.  Patient today is doing well.  He did not have any diarrhea overnight.  He states he had 1 small loose stool this morning and then had another small stool that was not diarrhea.  He is eating and drinking.  No nausea or vomiting. He feels ready for discharge home.  He has a friend that will pick him up.  He states he is kept his family up-to-date.    Consultations This Hospital Stay   None    Code Status   Full Code    Time Spent on this Encounter   I, Wellington Huynh MD, personally saw the patient today and spent greater than 30 minutes discharging this patient.       Wellington Huynh MD  [facility] OBSERVATION DEPT  [address] [place] [address]  Phone: [phone]  ______________________________________________________________________    Physical Exam   Vital Signs: Temp: 98.6  F (37  C) Temp src: Oral BP: 118/48 Pulse: 55   Resp: 18 SpO2: 92 % O2 Device: None (Room air)    Weight: 151 lbs 8 oz  Constitutional: awake, alert, cooperative, no apparent distress, and appears stated age  Eyes: Lids and lashes normal, pupils equal, round and reactive to light, extra ocular muscles intact, sclera clear, conjunctiva normal  ENT: Normocephalic, without obvious abnormality, atraumatic, sinuses nontender on palpation, external ears without lesions, oral pharynx with moist mucous membranes, tonsils without erythema or exudates, gums normal and good dentition.  Respiratory: No increased work of breathing, good air exchange, clear to auscultation bilaterally, no crackles or wheezing  Cardiovascular: Normal apical impulse, regular rate and rhythm, normal S1 and S2, no S3 or S4, and no murmur noted  GI: No scars, normal bowel sounds, soft, non-distended, non-tender, no masses palpated, no  hepatosplenomegally  Skin: no bruising or bleeding       Primary Care Physician   [facility]    Discharge Orders      Reason for your hospital stay    Nausea, vomiting, diarrhea. Likely viral gastroenteritis     Follow-up and recommended labs and tests     Follow up with primary care provider, [facility], within 7 days for hospital follow- up.  No follow up labs or test are needed.     Activity    Your activity upon discharge: activity as tolerated     Diet    Follow this diet upon discharge: Orders Placed This Encounter      Regular Diet Adult       Significant Results and Procedures   Most Recent 3 CBC's:Recent Labs   Lab Test 01/26/23  0717 11/08/21  1348 01/28/21  0536   WBC 6.8 9.0 8.1   HGB 14.9 16.8 13.2*   MCV 95 99 97    247 192     Most Recent 3 BMP's:Recent Labs   Lab Test 01/26/23  0717 11/08/21  1348 11/08/21  1347 01/28/21  0536   * 139  --  139   POTASSIUM 3.8 4.4  --  4.0   CHLORIDE 98 106  --  106   CO2 26 30  --  33*   BUN 15.6 19  --  17   CR 0.76 0.94  --  0.86   ANIONGAP 10 3  --  <1*   MAO 9.2 9.2  --  8.7   * 107* 102* 82     Most Recent 2 LFT's:Recent Labs   Lab Test 01/27/21  1023 06/08/20  1828   AST 20 21   ALT 26 23   ALKPHOS 78 78   BILITOTAL 0.5 0.5   ,   Results for orders placed or performed during the hospital encounter of 01/26/23   CT Abdomen Pelvis w Contrast    Narrative    CT ABDOMEN AND PELVIS WITH CONTRAST 1/26/2023 2:48 PM    CLINICAL HISTORY: Nausea and vomiting. Diarrhea.  TECHNIQUE: CT scan of the abdomen and pelvis was performed following  injection of IV contrast. Multiplanar reformats were obtained. Dose  reduction techniques were used.  CONTRAST: 75mL Isovue-370  COMPARISON: None.    FINDINGS:   LOWER CHEST: Mild scarring and/or atelectasis at both lung bases.  Small hiatal hernia.    HEPATOBILIARY: Cholecystectomy. Scattered small hepatic cysts would  require no specific follow-up. A few tiny hypodensities in the  liver  are too small to characterize, but may also represent cysts. Mild  intra and extrahepatic biliary dilatation may be related to the  patient's age and postcholecystectomy state.    PANCREAS: Normal.    SPLEEN: Normal.    ADRENAL GLANDS: Normal.    KIDNEYS/BLADDER: Unremarkable. No hydronephrosis.    BOWEL: No bowel obstruction. Colonic diverticulosis. No convincing  evidence for colitis or diverticulitis. Unremarkable appendix.    PELVIC ORGANS: Mild prostatic enlargement.    LYMPH NODES: No enlarged lymph nodes are identified in the abdomen or  pelvis.    VASCULATURE: Moderate atherosclerotic aortoiliac calcification.  Retroaortic left renal vein.    ADDITIONAL FINDINGS: None.    MUSCULOSKELETAL: Degenerative changes in the visualized thoracolumbar  spine. Intramuscular lipoma in the abdominal wall on the right  laterally measures 5.4 cm.      Impression    IMPRESSION:   1.  No acute abnormality in the abdomen or pelvis. No cause for  abdominal pain is identified.  2.  Colonic diverticulosis, without evidence for diverticulitis.    AMADEO KENNEDY MD         SYSTEM ID:  [id number]       Discharge Medications   Current Discharge Medication List      CONTINUE these medications which have NOT CHANGED    Details   acetaminophen (TYLENOL) 500 MG tablet Take 1,000 mg by mouth 3 times daily as needed       amLODIPine (NORVASC) 10 MG tablet Take 10 mg by mouth At Bedtime      aspirin (ASA) 325 MG EC tablet Take 325 mg by mouth daily      cyclobenzaprine (FLEXERIL) 5 MG tablet Take 1 tablet (5 mg) by mouth 3 times daily as needed for muscle spasms  Qty: 30 tablet, Refills: 0    Associated Diagnoses: Dizziness      ezetimibe (ZETIA) 10 MG tablet Take 10 mg by mouth daily      ferrous sulfate (FE TABS) 325 (65 Fe) MG EC tablet Take 325 mg by mouth three times a week      gabapentin (NEURONTIN) 300 MG capsule Take 900 mg by mouth At Bedtime       meclizine (ANTIVERT) 25 MG tablet Take 1 tablet (25 mg) by mouth 3 times  daily as needed for dizziness  Qty: 30 tablet, Refills: 1    Associated Diagnoses: Dizziness      metoclopramide (REGLAN) 10 MG tablet Take 1 tablet (10 mg) by mouth 4 times daily as needed (nausea, headache)  Qty: 20 tablet, Refills: 0      multivitamin w/minerals (THERA-VIT-M) tablet Take 1 tablet by mouth daily      omeprazole (PRILOSEC) 20 MG DR capsule Take 20 mg by mouth daily           Allergies   Allergies   Allergen Reactions     Atorvastatin      Cholestyramine      Diphenhydramine      Droperidol      Morphine      Norco [Hydrocodone-Acetaminophen]      Sulfa Drugs      Tramadol      Tylenol With Codeine [Acetaminophen-Codeine]

## 2023-02-17 ENCOUNTER — MEDICAL CORRESPONDENCE (OUTPATIENT)
Dept: HEALTH INFORMATION MANAGEMENT | Facility: CLINIC | Age: 88
End: 2023-02-17
Payer: COMMERCIAL

## 2023-02-17 ENCOUNTER — TRANSFERRED RECORDS (OUTPATIENT)
Dept: HEALTH INFORMATION MANAGEMENT | Facility: CLINIC | Age: 88
End: 2023-02-17
Payer: COMMERCIAL

## 2023-02-21 ENCOUNTER — TRANSCRIBE ORDERS (OUTPATIENT)
Dept: OTHER | Age: 88
End: 2023-02-21

## 2023-02-21 DIAGNOSIS — H53.2 BINOCULAR VISION DISORDER WITH DIPLOPIA: Primary | ICD-10-CM

## 2023-11-02 NOTE — PROGRESS NOTES
ROOM # 226    Living Situation (if not independent, order SW consult): alone  Facility name: NA  : [place]-daughter [phone]    Activity level at baseline: indep with cane  Activity level on admit: SBA      Patient registered to observation; given Patient Bill of Rights; given the opportunity to ask questions about observation status and their plan of care.  Patient has been oriented to the observation room, bathroom and call light is in place.    Discussed discharge goals and expectations with patient/family.          Coping Well

## 2025-07-28 ENCOUNTER — HOSPITAL ENCOUNTER (EMERGENCY)
Facility: CLINIC | Age: 89
End: 2025-07-28
Payer: COMMERCIAL